# Patient Record
Sex: FEMALE | Race: WHITE | Employment: UNEMPLOYED | URBAN - METROPOLITAN AREA
[De-identification: names, ages, dates, MRNs, and addresses within clinical notes are randomized per-mention and may not be internally consistent; named-entity substitution may affect disease eponyms.]

---

## 2018-03-27 PROBLEM — E78.00 PURE HYPERCHOLESTEROLEMIA: Status: ACTIVE | Noted: 2018-03-27

## 2018-03-27 PROBLEM — E55.9 VITAMIN D DEFICIENCY: Status: ACTIVE | Noted: 2018-03-27

## 2018-03-27 PROBLEM — I10 ESSENTIAL HYPERTENSION: Status: ACTIVE | Noted: 2018-03-27

## 2018-09-24 ENCOUNTER — APPOINTMENT (OUTPATIENT)
Dept: GENERAL RADIOLOGY | Age: 58
End: 2018-09-24
Payer: COMMERCIAL

## 2018-09-24 ENCOUNTER — APPOINTMENT (OUTPATIENT)
Dept: CT IMAGING | Age: 58
End: 2018-09-24
Payer: COMMERCIAL

## 2018-09-24 ENCOUNTER — HOSPITAL ENCOUNTER (OUTPATIENT)
Age: 58
Setting detail: OBSERVATION
Discharge: HOME | End: 2018-09-25
Attending: EMERGENCY MEDICINE | Admitting: INTERNAL MEDICINE
Payer: COMMERCIAL

## 2018-09-24 DIAGNOSIS — R07.89 OTHER CHEST PAIN: Primary | ICD-10-CM

## 2018-09-24 PROBLEM — R07.9 CHEST PAIN: Status: ACTIVE | Noted: 2018-09-24

## 2018-09-24 LAB
ALBUMIN SERPL-MCNC: 4.1 G/DL (ref 3.5–5.2)
ALP BLD-CCNC: 75 U/L (ref 35–104)
ALT SERPL-CCNC: 17 U/L (ref 0–32)
ANION GAP SERPL CALCULATED.3IONS-SCNC: 15 MMOL/L (ref 7–16)
AST SERPL-CCNC: 16 U/L (ref 0–31)
BASOPHILS ABSOLUTE: 0.03 E9/L (ref 0–0.2)
BASOPHILS RELATIVE PERCENT: 0.3 % (ref 0–2)
BILIRUB SERPL-MCNC: 0.5 MG/DL (ref 0–1.2)
BUN BLDV-MCNC: 13 MG/DL (ref 8–23)
CALCIUM SERPL-MCNC: 9.4 MG/DL (ref 8.6–10.2)
CHLORIDE BLD-SCNC: 99 MMOL/L (ref 98–107)
CO2: 26 MMOL/L (ref 22–29)
CREAT SERPL-MCNC: 0.8 MG/DL (ref 0.5–1)
D DIMER: <200 NG/ML DDU
EOSINOPHILS ABSOLUTE: 0.09 E9/L (ref 0.05–0.5)
EOSINOPHILS RELATIVE PERCENT: 1 % (ref 0–6)
GFR AFRICAN AMERICAN: >60
GFR NON-AFRICAN AMERICAN: >60 ML/MIN/1.73
GLUCOSE BLD-MCNC: 113 MG/DL (ref 74–109)
HCT VFR BLD CALC: 40 % (ref 34–48)
HEMOGLOBIN: 13 G/DL (ref 11.5–15.5)
IMMATURE GRANULOCYTES #: 0.03 E9/L
IMMATURE GRANULOCYTES %: 0.3 % (ref 0–5)
LYMPHOCYTES ABSOLUTE: 2.25 E9/L (ref 1.5–4)
LYMPHOCYTES RELATIVE PERCENT: 26.2 % (ref 20–42)
MCH RBC QN AUTO: 28.1 PG (ref 26–35)
MCHC RBC AUTO-ENTMCNC: 32.5 % (ref 32–34.5)
MCV RBC AUTO: 86.4 FL (ref 80–99.9)
MONOCYTES ABSOLUTE: 0.56 E9/L (ref 0.1–0.95)
MONOCYTES RELATIVE PERCENT: 6.5 % (ref 2–12)
NEUTROPHILS ABSOLUTE: 5.62 E9/L (ref 1.8–7.3)
NEUTROPHILS RELATIVE PERCENT: 65.7 % (ref 43–80)
PDW BLD-RTO: 13.8 FL (ref 11.5–15)
PLATELET # BLD: 305 E9/L (ref 130–450)
PMV BLD AUTO: 10.5 FL (ref 7–12)
POTASSIUM SERPL-SCNC: 3.5 MMOL/L (ref 3.5–5)
RBC # BLD: 4.63 E12/L (ref 3.5–5.5)
SODIUM BLD-SCNC: 140 MMOL/L (ref 132–146)
TOTAL PROTEIN: 7.4 G/DL (ref 6.4–8.3)
TROPONIN: <0.01 NG/ML (ref 0–0.03)
TROPONIN: <0.01 NG/ML (ref 0–0.03)
WBC # BLD: 8.6 E9/L (ref 4.5–11.5)

## 2018-09-24 PROCEDURE — 80053 COMPREHEN METABOLIC PANEL: CPT

## 2018-09-24 PROCEDURE — 6370000000 HC RX 637 (ALT 250 FOR IP): Performed by: EMERGENCY MEDICINE

## 2018-09-24 PROCEDURE — 85025 COMPLETE CBC W/AUTO DIFF WBC: CPT

## 2018-09-24 PROCEDURE — 71046 X-RAY EXAM CHEST 2 VIEWS: CPT

## 2018-09-24 PROCEDURE — G0378 HOSPITAL OBSERVATION PER HR: HCPCS

## 2018-09-24 PROCEDURE — 85378 FIBRIN DEGRADE SEMIQUANT: CPT

## 2018-09-24 PROCEDURE — 96374 THER/PROPH/DIAG INJ IV PUSH: CPT

## 2018-09-24 PROCEDURE — 70450 CT HEAD/BRAIN W/O DYE: CPT

## 2018-09-24 PROCEDURE — 6370000000 HC RX 637 (ALT 250 FOR IP): Performed by: NURSE PRACTITIONER

## 2018-09-24 PROCEDURE — 94761 N-INVAS EAR/PLS OXIMETRY MLT: CPT

## 2018-09-24 PROCEDURE — 93005 ELECTROCARDIOGRAM TRACING: CPT | Performed by: EMERGENCY MEDICINE

## 2018-09-24 PROCEDURE — 6370000000 HC RX 637 (ALT 250 FOR IP): Performed by: INTERNAL MEDICINE

## 2018-09-24 PROCEDURE — 36415 COLL VENOUS BLD VENIPUNCTURE: CPT

## 2018-09-24 PROCEDURE — 99285 EMERGENCY DEPT VISIT HI MDM: CPT

## 2018-09-24 PROCEDURE — 84484 ASSAY OF TROPONIN QUANT: CPT

## 2018-09-24 RX ORDER — ATORVASTATIN CALCIUM 20 MG/1
20 TABLET, FILM COATED ORAL DAILY
Status: DISCONTINUED | OUTPATIENT
Start: 2018-09-24 | End: 2018-09-25 | Stop reason: HOSPADM

## 2018-09-24 RX ORDER — FAMOTIDINE 20 MG/1
20 TABLET, FILM COATED ORAL DAILY
Status: DISCONTINUED | OUTPATIENT
Start: 2018-09-24 | End: 2018-09-25 | Stop reason: HOSPADM

## 2018-09-24 RX ORDER — METOPROLOL SUCCINATE 25 MG/1
25 TABLET, EXTENDED RELEASE ORAL DAILY
Status: DISCONTINUED | OUTPATIENT
Start: 2018-09-25 | End: 2018-09-25 | Stop reason: HOSPADM

## 2018-09-24 RX ORDER — ONDANSETRON 2 MG/ML
4 INJECTION INTRAMUSCULAR; INTRAVENOUS ONCE
Status: DISCONTINUED | OUTPATIENT
Start: 2018-09-24 | End: 2018-09-24

## 2018-09-24 RX ORDER — PANTOPRAZOLE SODIUM 40 MG/1
40 TABLET, DELAYED RELEASE ORAL
Status: DISCONTINUED | OUTPATIENT
Start: 2018-09-25 | End: 2018-09-25 | Stop reason: HOSPADM

## 2018-09-24 RX ORDER — METOPROLOL SUCCINATE 25 MG/1
25 TABLET, EXTENDED RELEASE ORAL ONCE
Status: COMPLETED | OUTPATIENT
Start: 2018-09-24 | End: 2018-09-24

## 2018-09-24 RX ORDER — HYDROCHLOROTHIAZIDE 25 MG/1
25 TABLET ORAL DAILY
Status: DISCONTINUED | OUTPATIENT
Start: 2018-09-24 | End: 2018-09-25 | Stop reason: HOSPADM

## 2018-09-24 RX ORDER — RANITIDINE 150 MG/1
150 TABLET, FILM COATED ORAL 2 TIMES DAILY
COMMUNITY
End: 2019-05-29 | Stop reason: ALTCHOICE

## 2018-09-24 RX ORDER — ASPIRIN 81 MG/1
324 TABLET, CHEWABLE ORAL ONCE
Status: COMPLETED | OUTPATIENT
Start: 2018-09-24 | End: 2018-09-24

## 2018-09-24 RX ORDER — HYDRALAZINE HYDROCHLORIDE 20 MG/ML
10 INJECTION INTRAMUSCULAR; INTRAVENOUS ONCE
Status: DISCONTINUED | OUTPATIENT
Start: 2018-09-24 | End: 2018-09-24

## 2018-09-24 RX ADMIN — HYDROCHLOROTHIAZIDE 25 MG: 25 TABLET ORAL at 21:29

## 2018-09-24 RX ADMIN — METOPROLOL SUCCINATE 25 MG: 25 TABLET, EXTENDED RELEASE ORAL at 17:29

## 2018-09-24 RX ADMIN — ATORVASTATIN CALCIUM 20 MG: 20 TABLET, FILM COATED ORAL at 21:29

## 2018-09-24 RX ADMIN — ASPIRIN 81 MG CHEWABLE TABLET 324 MG: 81 TABLET CHEWABLE at 16:23

## 2018-09-24 RX ADMIN — FAMOTIDINE 20 MG: 20 TABLET ORAL at 21:30

## 2018-09-24 NOTE — ED NOTES
FIRST PROVIDER CONTACT ASSESSMENT NOTE      Department of Emergency Medicine   9/24/18  3:46 PM    Chief Complaint: Extremity Weakness (started having back pain early this morning radiated to her chest, then felt generalized weakness to all limbs. )      History of Present Illness:   Greta Mckeon is a 59 y.o. female who presents to the ED for Generalized weakness starting earlier today with fatigue and dizziness. Patient states that she started having back pain . 8 AM this morning. She denies any blurred vision nausea vomiting or chest pain. Medical History:  has a past medical history of Arthritis; Dysthymic disorder; Hyperlipidemia; Hypertension; Hypothyroidism; Seborrhea capitis; Vertigo; and Vitamin D deficiency. Surgical History:  has a past surgical history that includes Cholecystectomy; eye surgery; and Appendectomy. Social History:  reports that she quit smoking about 35 years ago. She has never used smokeless tobacco. She reports that she drinks about 1.2 oz of alcohol per week . She reports that she does not use drugs. Family History: family history includes Diabetes in her father; Heart Disease in her father, mother, and sister; High Blood Pressure in her father and mother; Other in her sister; Stroke in her mother. *ALLERGIES*     Patient has no known allergies.      Physical Exam:      VS:  BP (!) 192/88   Pulse 96   Temp 97.5 °F (36.4 °C) (Temporal)   Resp 16   Ht 5' 6\" (1.676 m)   Wt 201 lb (91.2 kg)   SpO2 99%   BMI 32.44 kg/m²      Initial Plan of Care:  Initiate Treatment-Testing, Proceed toTreatment Area When Bed Available for ED Attending/MLP to Continue Care    -----------------END OF FIRST PROVIDER CONTACT ASSESSMENT NOTE--------------  Electronically signed by TAYLER Drew CNP   DD: 9/24/18           TAYLER Drew CNP  09/24/18 3436

## 2018-09-24 NOTE — ED PROVIDER NOTES
HPI:  9/24/18,   Time: 4:05 PM         Carolyn Vargas is a 59 y.o. female presenting to the ED for Dizzines with chest pain left shoulder radiating to the left chest , beginning several hours  ago. The complaint has been persistent, moderate in severity, and worsened by standing, deep breath. Patient's states the chest pains were coughing. She does report a history of hypertension hyperlipidemia. She does not smoke, stopped smoking 35 years ago. . Does have an extensive family history of coronary artery disease. She denies any fevers chills nausea vomiting. ROS:   Pertinent positives and negatives are stated within HPI, all other systems reviewed and are negative.  --------------------------------------------- PAST HISTORY ---------------------------------------------  Past Medical History:  has a past medical history of Arthritis; Dysthymic disorder; Hyperlipidemia; Hypertension; Hypothyroidism; Seborrhea capitis; Vertigo; and Vitamin D deficiency. Past Surgical History:  has a past surgical history that includes Cholecystectomy; eye surgery; and Appendectomy. Social History:  reports that she quit smoking about 35 years ago. She has never used smokeless tobacco. She reports that she drinks about 1.2 oz of alcohol per week . She reports that she does not use drugs. Family History: family history includes Diabetes in her father; Heart Disease in her father, mother, and sister; High Blood Pressure in her father and mother; Other in her sister; Stroke in her mother. The patients home medications have been reviewed. Allergies: Patient has no known allergies.     -------------------------------------------------- RESULTS -------------------------------------------------  All laboratory and radiology results have been personally reviewed by myself   LABS:  Results for orders placed or performed during the hospital encounter of 09/24/18   CBC auto differential   Result Value Ref Range    WBC 8.6 4.5 - 11.5 E9/L    RBC 4.63 3.50 - 5.50 E12/L    Hemoglobin 13.0 11.5 - 15.5 g/dL    Hematocrit 40.0 34.0 - 48.0 %    MCV 86.4 80.0 - 99.9 fL    MCH 28.1 26.0 - 35.0 pg    MCHC 32.5 32.0 - 34.5 %    RDW 13.8 11.5 - 15.0 fL    Platelets 007 822 - 034 E9/L    MPV 10.5 7.0 - 12.0 fL    Neutrophils % 65.7 43.0 - 80.0 %    Immature Granulocytes % 0.3 0.0 - 5.0 %    Lymphocytes % 26.2 20.0 - 42.0 %    Monocytes % 6.5 2.0 - 12.0 %    Eosinophils % 1.0 0.0 - 6.0 %    Basophils % 0.3 0.0 - 2.0 %    Neutrophils # 5.62 1.80 - 7.30 E9/L    Immature Granulocytes # 0.03 E9/L    Lymphocytes # 2.25 1.50 - 4.00 E9/L    Monocytes # 0.56 0.10 - 0.95 E9/L    Eosinophils # 0.09 0.05 - 0.50 E9/L    Basophils # 0.03 0.00 - 0.20 E9/L   Comprehensive Metabolic Panel   Result Value Ref Range    Sodium 140 132 - 146 mmol/L    Potassium 3.5 3.5 - 5.0 mmol/L    Chloride 99 98 - 107 mmol/L    CO2 26 22 - 29 mmol/L    Anion Gap 15 7 - 16 mmol/L    Glucose 113 (H) 74 - 109 mg/dL    BUN 13 8 - 23 mg/dL    CREATININE 0.8 0.5 - 1.0 mg/dL    GFR Non-African American >60 >=60 mL/min/1.73    GFR African American >60     Calcium 9.4 8.6 - 10.2 mg/dL    Total Protein 7.4 6.4 - 8.3 g/dL    Alb 4.1 3.5 - 5.2 g/dL    Total Bilirubin 0.5 0.0 - 1.2 mg/dL    Alkaline Phosphatase 75 35 - 104 U/L    ALT 17 0 - 32 U/L    AST 16 0 - 31 U/L   Troponin   Result Value Ref Range    Troponin <0.01 0.00 - 0.03 ng/mL   D-Dimer, Quantitative   Result Value Ref Range    D-Dimer, Quant <200 ng/mL DDU   Troponin   Result Value Ref Range    Troponin <0.01 0.00 - 0.03 ng/mL   Troponin   Result Value Ref Range    Troponin <0.01 0.00 - 0.03 ng/mL   EKG 12 Lead   Result Value Ref Range    Ventricular Rate 81 BPM    Atrial Rate 81 BPM    P-R Interval 158 ms    QRS Duration 88 ms    Q-T Interval 396 ms    QTc Calculation (Bazett) 460 ms    P Axis 57 degrees    R Axis 48 degrees    T Axis 54 degrees       RADIOLOGY:  Interpreted by Radiologist.  Stress/Rest NM Myocardial SPECT RX 1623)   metoprolol succinate (TOPROL XL) extended release tablet 25 mg (25 mg Oral Given 18 1729)   regadenoson (LEXISCAN) injection 0.4 mg (0.4 mg Intravenous Given 18 0942)   technetium sestamibi (CARDIOLITE) injection 10 millicurie (10 millicuries Intravenous Given 18 0805)   technetium sestamibi (CARDIOLITE) injection 35 millicurie (35 millicuries Intravenous Given 18 0944)         Medical Decision Makin yo female presents with left shoulder pain rating to her left chest. Significant family history coronary artery disease. Patient stated her last stress test was a year ago. ASA given. Counseling: The emergency provider has spoken with the patient and family member patient, father and daughter and discussed todays results, in addition to providing specific details for the plan of care and counseling regarding the diagnosis and prognosis. Questions are answered at this time and they are agreeable with the plan.      --------------------------------- IMPRESSION AND DISPOSITION ---------------------------------    IMPRESSION  1.  Other chest pain        DISPOSITION  Disposition: Admit to telemetry  Patient condition is stable               Nolvia Strong DO  [phone]

## 2018-09-25 ENCOUNTER — APPOINTMENT (OUTPATIENT)
Dept: NUCLEAR MEDICINE | Age: 58
End: 2018-09-25
Payer: COMMERCIAL

## 2018-09-25 VITALS
WEIGHT: 201 LBS | RESPIRATION RATE: 16 BRPM | BODY MASS INDEX: 32.3 KG/M2 | DIASTOLIC BLOOD PRESSURE: 84 MMHG | TEMPERATURE: 97.6 F | HEIGHT: 66 IN | OXYGEN SATURATION: 99 % | SYSTOLIC BLOOD PRESSURE: 160 MMHG | HEART RATE: 66 BPM

## 2018-09-25 PROBLEM — I10 UNCONTROLLED HYPERTENSION: Status: ACTIVE | Noted: 2018-09-25

## 2018-09-25 PROBLEM — R07.9 CHEST PAIN: Status: RESOLVED | Noted: 2018-09-24 | Resolved: 2018-09-25

## 2018-09-25 LAB
LV EF: 79 %
LVEF MODALITY: NORMAL
TROPONIN: <0.01 NG/ML (ref 0–0.03)

## 2018-09-25 PROCEDURE — G0378 HOSPITAL OBSERVATION PER HR: HCPCS

## 2018-09-25 PROCEDURE — 6370000000 HC RX 637 (ALT 250 FOR IP): Performed by: INTERNAL MEDICINE

## 2018-09-25 PROCEDURE — 3430000000 HC RX DIAGNOSTIC RADIOPHARMACEUTICAL: Performed by: RADIOLOGY

## 2018-09-25 PROCEDURE — 78452 HT MUSCLE IMAGE SPECT MULT: CPT

## 2018-09-25 PROCEDURE — 6360000002 HC RX W HCPCS: Performed by: INTERNAL MEDICINE

## 2018-09-25 PROCEDURE — 84484 ASSAY OF TROPONIN QUANT: CPT

## 2018-09-25 PROCEDURE — 36415 COLL VENOUS BLD VENIPUNCTURE: CPT

## 2018-09-25 PROCEDURE — A9500 TC99M SESTAMIBI: HCPCS | Performed by: RADIOLOGY

## 2018-09-25 PROCEDURE — 93017 CV STRESS TEST TRACING ONLY: CPT

## 2018-09-25 RX ORDER — AMLODIPINE BESYLATE 5 MG/1
5 TABLET ORAL DAILY
Qty: 30 TABLET | Refills: 3 | Status: SHIPPED | OUTPATIENT
Start: 2018-09-25 | End: 2018-11-30 | Stop reason: SDUPTHER

## 2018-09-25 RX ORDER — ASPIRIN 81 MG/1
81 TABLET ORAL DAILY
Status: DISCONTINUED | OUTPATIENT
Start: 2018-09-25 | End: 2018-09-25 | Stop reason: HOSPADM

## 2018-09-25 RX ORDER — ASPIRIN 81 MG/1
81 TABLET ORAL DAILY
Qty: 30 TABLET | Refills: 3 | COMMUNITY
Start: 2018-09-25

## 2018-09-25 RX ADMIN — REGADENOSON 0.4 MG: 0.08 INJECTION, SOLUTION INTRAVENOUS at 09:42

## 2018-09-25 RX ADMIN — Medication 35 MILLICURIE: at 09:44

## 2018-09-25 RX ADMIN — Medication 10 MILLICURIE: at 08:05

## 2018-09-25 RX ADMIN — ATORVASTATIN CALCIUM 20 MG: 20 TABLET, FILM COATED ORAL at 13:27

## 2018-09-25 RX ADMIN — HYDROCHLOROTHIAZIDE 25 MG: 25 TABLET ORAL at 13:27

## 2018-09-25 RX ADMIN — FAMOTIDINE 20 MG: 20 TABLET ORAL at 11:11

## 2018-09-25 RX ADMIN — METOPROLOL SUCCINATE 25 MG: 25 TABLET, FILM COATED, EXTENDED RELEASE ORAL at 13:27

## 2018-09-25 NOTE — H&P
[facility]  History and Physical      CHIEF COMPLAINT:  Chest pain, facial numbness    Reason for Admission:  As above    History Obtained From:  Patient and     PCP :  Maciel Lemus MD    [address] [facility] [address]      HISTORY OF PRESENT ILLNESS:      The patient is a 59 y.o. female presented to the emergency room with chief complaint of chest pain. She also had dizziness, numbness in the face.  reports the patient has skipped her blood pressure medications the day prior. She was noted to be hypertensive in the emergency room. Her blood pressure is much lower at the current time. She has no other complaints. She is status post stress test.     Past Medical History:        Diagnosis Date    Arthritis     osteoarthritis    Dysthymic disorder     Hyperlipidemia     Hypertension     Hypothyroidism     Seborrhea capitis     Vertigo     Vitamin D deficiency      Past Surgical History:        Procedure Laterality Date    APPENDECTOMY      CHOLECYSTECTOMY      EYE SURGERY           Medications Prior to Admission:    Prescriptions Prior to Admission: ranitidine (RANITIDINE 150 MAX STRENGTH) 150 MG tablet, Take 150 mg by mouth 2 times daily  atorvastatin (LIPITOR) 20 MG tablet, Take 1 tablet by mouth daily  hydrochlorothiazide (HYDRODIURIL) 25 MG tablet, Take 1 tablet by mouth daily  metoprolol succinate (TOPROL XL) 25 MG extended release tablet, Take 1 tablet by mouth daily  omeprazole (PRILOSEC) 10 MG capsule, Take 10 mg by mouth daily. Allergies:  Patient has no known allergies. Social History:   Social History     Social History    Marital status:      Spouse name: N/A    Number of children: N/A    Years of education: N/A     Occupational History    Not on file.      Social History Main Topics    Smoking status: Former Smoker     Quit date: 2/24/1983    Smokeless tobacco: Never Used    Alcohol use 1.2 oz/week     2 Glasses of wine per week    MCV 86.4 80.0 - 99.9 fL    MCH 28.1 26.0 - 35.0 pg    MCHC 32.5 32.0 - 34.5 %    RDW 13.8 11.5 - 15.0 fL    Platelets 675 853 - 581 E9/L    MPV 10.5 7.0 - 12.0 fL    Neutrophils % 65.7 43.0 - 80.0 %    Immature Granulocytes % 0.3 0.0 - 5.0 %    Lymphocytes % 26.2 20.0 - 42.0 %    Monocytes % 6.5 2.0 - 12.0 %    Eosinophils % 1.0 0.0 - 6.0 %    Basophils % 0.3 0.0 - 2.0 %    Neutrophils # 5.62 1.80 - 7.30 E9/L    Immature Granulocytes # 0.03 E9/L    Lymphocytes # 2.25 1.50 - 4.00 E9/L    Monocytes # 0.56 0.10 - 0.95 E9/L    Eosinophils # 0.09 0.05 - 0.50 E9/L    Basophils # 0.03 0.00 - 0.20 E9/L   Comprehensive Metabolic Panel    Collection Time: 09/24/18  2:55 PM   Result Value Ref Range    Sodium 140 132 - 146 mmol/L    Potassium 3.5 3.5 - 5.0 mmol/L    Chloride 99 98 - 107 mmol/L    CO2 26 22 - 29 mmol/L    Anion Gap 15 7 - 16 mmol/L    Glucose 113 (H) 74 - 109 mg/dL    BUN 13 8 - 23 mg/dL    CREATININE 0.8 0.5 - 1.0 mg/dL    GFR Non-African American >60 >=60 mL/min/1.73    GFR African American >60     Calcium 9.4 8.6 - 10.2 mg/dL    Total Protein 7.4 6.4 - 8.3 g/dL    Alb 4.1 3.5 - 5.2 g/dL    Total Bilirubin 0.5 0.0 - 1.2 mg/dL    Alkaline Phosphatase 75 35 - 104 U/L    ALT 17 0 - 32 U/L    AST 16 0 - 31 U/L   Troponin    Collection Time: 09/24/18  2:55 PM   Result Value Ref Range    Troponin <0.01 0.00 - 0.03 ng/mL   D-Dimer, Quantitative    Collection Time: 09/24/18  4:20 PM   Result Value Ref Range    D-Dimer, Quant <200 ng/mL DDU   Troponin    Collection Time: 09/24/18  8:01 PM   Result Value Ref Range    Troponin <0.01 0.00 - 0.03 ng/mL   Troponin    Collection Time: 09/25/18 12:29 AM   Result Value Ref Range    Troponin <0.01 0.00 - 0.03 ng/mL       Stress/Rest NM Myocardial SPECT RX   Final Result   ? 1. No pharmacologically-induced perfusion defect identified. 2. Normal left ventricular wall motion.    3. Left ventricular ejection fraction of 79 %         CT Head WO Contrast   Final Result   No

## 2018-09-25 NOTE — CONSULTS
Glasses of wine per week    Drug use: No    Sexual activity: Not on file     Other Topics Concern    Not on file     Social History Narrative    No narrative on file       Allergies:  No Known Allergies    Home Meds:  Prior to Admission medications    Medication Sig Start Date End Date Taking? Authorizing Provider   ranitidine (RANITIDINE 150 MAX STRENGTH) 150 MG tablet Take 150 mg by mouth 2 times daily   Yes Historical Provider, MD   atorvastatin (LIPITOR) 20 MG tablet Take 1 tablet by mouth daily 4/24/18 9/24/18 Yes Bhavya Cameron MD   hydrochlorothiazide (HYDRODIURIL) 25 MG tablet Take 1 tablet by mouth daily 4/24/18 9/24/18 Yes Bhavya Cameron MD   metoprolol succinate (TOPROL XL) 25 MG extended release tablet Take 1 tablet by mouth daily 4/24/18 9/24/18 Yes Bhavya Cameron MD   omeprazole (PRILOSEC) 10 MG capsule Take 10 mg by mouth daily.    Yes Historical Provider, MD       Current Medications:  Current Facility-Administered Medications   Medication Dose Route Frequency Provider Last Rate Last Dose    regadenoson (LEXISCAN) injection 0.4 mg  0.4 mg Intravenous ONCE PRN David Salazar MD        technetium sestamibi (CARDIOLITE) injection 35 millicurie  35 millicurie Intravenous Once Addison Louis MD        pantoprazole (PROTONIX) tablet 40 mg  40 mg Oral QAM AC Junior Courtney MD        atorvastatin (LIPITOR) tablet 20 mg  20 mg Oral Daily Junior Courtney MD   20 mg at 09/24/18 2129    hydrochlorothiazide (HYDRODIURIL) tablet 25 mg  25 mg Oral Daily Junior Courtney MD   25 mg at 09/24/18 2129    metoprolol succinate (TOPROL XL) extended release tablet 25 mg  25 mg Oral Daily Junior Courtney MD        famotidine (PEPCID) tablet 20 mg  20 mg Oral Daily Junior Courtney MD   20 mg at 09/24/18 2130         Review of Systems:   Constitutional: No fever, chills, sweats  Cardiac: As per HPI  Pulmonary: No cough, wheeze, hemoptysis  HEENT: No visual disturbances or difficult swallowing  GI: No nausea, vomiting, diarrhea, abdominal pain, rectal bleeding  : No dysuria or hematuria  Endocrine: No excessive thirst, heat or cold intolerance. Musculoskeletal: Left shoulder pain (previous rotator cuff injury) or muscle aches. No claudication  Skin: No skin breakdown or rashes  Neuro: No headache, confusion, or seizures  Psych: No depression, anxiety    Physical Exam:  BP (!) 170/76   Pulse 60   Temp 97.8 °F (36.6 °C)   Resp 16   Ht 5' 6\" (1.676 m)   Wt 201 lb (91.2 kg)   SpO2 99%   BMI 32.44 kg/m²   Weight change: Wt Readings from Last 3 Encounters:   09/24/18 201 lb (91.2 kg)   08/28/18 201 lb (91.2 kg)   03/27/18 199 lb (90.3 kg)       General: Awake, alert, oriented x3, no acute distress    HEENT: Normocephalic and atraumatic, pupils equal and round. Sclera nonicteric and oral mucosa moist.  Tongue and trachea midline. Neck: No JVD or bruits. No thyroid enlargement or adenopathy    Cardiac: Regular rate and rhythm, normal S1 and S2, no S3. Apical impulse is normal.  No murmurs, rubs or clicks. No carotid bruits and no abdominal bruits. No peripheral edema, cyanosis or clubbing. Normal pulses in the upper and lower extremities bilaterally. Resp: Unlabored respirations at rest.  No wheezes, rales or rhonchi. Abdomen: soft, nontender, nondistended, no gross organomegaly or mass    Skin: Warm and dry, no cyanosis. Musculoskeletal: normal tone and strength in the upper and lower extremities bilaterally    Neuro: Moves all extremities appropriately to command. Normal sensation to light touch in the upper and lower extremities bilaterally    Psych: Cooperative, and normal affect    Intake/Output:  No intake or output data in the 24 hours ending 09/25/18 0840  No intake/output data recorded.     Laboratory Tests:  Lab Results   Component Value Date    CREATININE 0.8 09/24/2018    BUN 13 09/24/2018     09/24/2018    K 3.5 09/24/2018

## 2018-09-25 NOTE — PROGRESS NOTES
Nuclear stress test images reviewed:  LVEF normal, and no fixed or reversible defects. No further inpatient cardiac plans and will sign off. Please call if any questions.   Thank you

## 2018-09-27 LAB
EKG ATRIAL RATE: 81 BPM
EKG P AXIS: 57 DEGREES
EKG P-R INTERVAL: 158 MS
EKG Q-T INTERVAL: 396 MS
EKG QRS DURATION: 88 MS
EKG QTC CALCULATION (BAZETT): 460 MS
EKG R AXIS: 48 DEGREES
EKG T AXIS: 54 DEGREES
EKG VENTRICULAR RATE: 81 BPM

## 2019-12-09 PROBLEM — I10 UNCONTROLLED HYPERTENSION: Status: RESOLVED | Noted: 2018-09-25 | Resolved: 2019-12-09

## 2020-10-06 ENCOUNTER — TELEPHONE (OUTPATIENT)
Dept: ADMINISTRATIVE | Age: 60
End: 2020-10-06

## 2020-10-06 NOTE — TELEPHONE ENCOUNTER
[facility] for: 10/12/20 @ 12:40. Patient Appointment Form:      PCP: Óscar Adamson   Referring: Óscar Adamson    Has the Patient:    Seen a Cardiologist? Yes - Dr Stephenie Quiroz - [facility] only - [facility], Dr. Boby Rivera now    Had a heart catheterization? No    Had heart surgery? No    Had a stress test or nuclear stress test? Yes Epic     Had an echocardiogram? Many years ago - over 10 years - ? Not sure where     Had a vascular ultrasound? Yes - duplex - Epic     Had a 24/48 heart monitor or extended cardiac event monitor? No    Had recent blood work in the last 6 months? Yes - Epic - [facility]     Had a pacemaker/ICD/ILR implant? No    Seen an Electrophysiologist? No        Will send records via: All Recs in Epic       Date & time of appointment:  10/12/20 @ 12:40 with Dr. Boby Rivera.

## 2020-10-15 ENCOUNTER — OFFICE VISIT (OUTPATIENT)
Dept: CARDIOLOGY CLINIC | Age: 60
End: 2020-10-15
Payer: COMMERCIAL

## 2020-10-15 VITALS
SYSTOLIC BLOOD PRESSURE: 136 MMHG | WEIGHT: 209 LBS | HEART RATE: 66 BPM | HEIGHT: 66 IN | DIASTOLIC BLOOD PRESSURE: 84 MMHG | BODY MASS INDEX: 33.59 KG/M2 | RESPIRATION RATE: 16 BRPM

## 2020-10-15 PROCEDURE — 93000 ELECTROCARDIOGRAM COMPLETE: CPT | Performed by: INTERNAL MEDICINE

## 2020-10-15 PROCEDURE — 99244 OFF/OP CNSLTJ NEW/EST MOD 40: CPT | Performed by: INTERNAL MEDICINE

## 2020-10-15 RX ORDER — DIMENHYDRINATE 50 MG
200 TABLET ORAL DAILY
COMMUNITY

## 2020-10-15 NOTE — PROGRESS NOTES
Patient Active Problem List   Diagnosis    Essential hypertension    Vitamin D deficiency    Pure hypercholesterolemia       Current Outpatient Medications   Medication Sig Dispense Refill    Coenzyme Q10 (CO Q-10) 100 MG CAPS Take by mouth daily      rosuvastatin (CRESTOR) 20 MG tablet TAKE 1 TABLET BY MOUTH EVERY NIGHT 30 tablet 11    ezetimibe (ZETIA) 10 MG tablet Take 1 tablet by mouth daily 30 tablet 11    hydrochlorothiazide (HYDRODIURIL) 25 MG tablet Take 1 tablet by mouth daily 30 tablet 11    metoprolol succinate (TOPROL XL) 25 MG extended release tablet Take 1 tablet by mouth daily 30 tablet 11    amLODIPine (NORVASC) 5 MG tablet Take 1 tablet by mouth daily 30 tablet 11    Cholecalciferol (VITAMIN D3) 2000 units CAPS Take by mouth      calcium carbonate 600 MG TABS tablet Take 1 tablet by mouth daily      Multiple Vitamin (MULTI-VITAMIN DAILY PO) Take by mouth      diclofenac sodium 1 % GEL Apply 4 g topically 4 times daily 2 Tube 5    aspirin 81 MG EC tablet Take 1 tablet by mouth daily 30 tablet 3    omeprazole (PRILOSEC) 10 MG capsule Take 10 mg by mouth daily. No current facility-administered medications for this visit. CC:    Patient is seen in Initial Evaluation:  1. Precordial pain    2. Essential hypertension    3. Pure hypercholesterolemia    4. Palpitations    5. Family history of early CAD        HPI:  Patient seen in evaluation for chest pain. Has atypical stabbing chest pain that radiates to her neck. Not associated with exertion. Notes diaphoresis afterwards. Family history of early CAD. Also notes development of palpitations - mainly at night.     ROS:   General: No unusual weight gain, no change in exercise tolerance  Skin: No rash or itching  EENT: No vision changes or nosebleeds  Cardiovascular: No orthopnea or paroxysmal nocturnal dyspnea  Respiratory: No cough or hemoptysis  Gastrointestinal: No hematemesis or recent changes in bowel Other Sister         osteoporosis    Heart Disease Sister         MVP       Past Medical History:   Diagnosis Date    Arthritis     osteoarthritis    Dysthymic disorder     Hyperlipidemia     Hypertension     Hypothyroidism     Seborrhea capitis     Vertigo     Vitamin D deficiency        PHYSICAL EXAM:  CONSTITUTIONAL:  Well developed, well nourished    Vitals:    10/15/20 1037   BP: 136/84   Pulse: 66   Resp: 16   Weight: 209 lb (94.8 kg)   Height: 5' 6\" (1.676 m)     HEAD & FACE: Normocephalic. Symmetric. EYES: No xanthelasma. Conjunctivae not injected. EARS, NOSE, MOUTH & THROAT: Good dentition. No oral pallor or cyanosis. NECK: No JVD at 30 degrees. No thyromegaly. RESPIRATORY: Clear to auscultation and percussion in all fields. No use of accessory muscle or intercostal retractions. CARDIOVASCULAR: Regular rate and rhythm. No lifts or thrills on palpitation. Auscultation with normal S1-S2 in intensity and splitting. No carotid bruits. Abdominal aorta not enlarged. Femoral arteries without bruits. Pedal pulses 2+. No edema. ABDOMEN: Soft without hepatic or splenic enlargement. No tenderness. MUSCULOSKELETAL: No kyphosis or scoliosis of the back. Good muscle strength and tone. No muscle atrophy. Normal gait and ability to undergo exercise stress testing. EXTREMITIES: No clubbing or cyanosis. SKIN: No Xanthomas or ulcerations. NEUROLOGIC: Oriented to time, place and person. Normal mood and affect. LYMPHATIC:  No palpable neck or supraclavicular nodes. No splenomegaly. EKG: the EKG tracing was reviewed and found to reveal: Normal sinus rhythm. No change compared to prior tracing. ASSESSMENT:                                                     ORDERS:       Diagnosis Orders   1. Precordial pain  EKG 12 Lead    CTA CORON EJECT FRAC WALL MOTION   2. Essential hypertension     3. Pure hypercholesterolemia     4. Palpitations     5.  Family history of early CAD Above assessment cardiac issues stable. PLAN:   See above orders. Medication reconciliation completed. Patient unable to walk. We will schedule for coronary CTA. Old records were reviewed and found to reveal: LDL cholesterol 90 on 5/12/2020  Discussed issues that would prompt earlier evaluation. Same cardiac medications. Follow-up office visit in -pending results of above testing.

## 2020-10-20 ENCOUNTER — HOSPITAL ENCOUNTER (OUTPATIENT)
Age: 60
Discharge: HOME | End: 2020-10-20
Payer: COMMERCIAL

## 2020-10-20 LAB
ANION GAP SERPL CALCULATED.3IONS-SCNC: 12 MMOL/L (ref 7–16)
BUN BLDV-MCNC: 13 MG/DL (ref 8–23)
CALCIUM SERPL-MCNC: 9.5 MG/DL (ref 8.6–10.2)
CHLORIDE BLD-SCNC: 98 MMOL/L (ref 98–107)
CO2: 29 MMOL/L (ref 22–29)
CREAT SERPL-MCNC: 0.9 MG/DL (ref 0.5–1)
GFR AFRICAN AMERICAN: >60
GFR NON-AFRICAN AMERICAN: >60 ML/MIN/1.73
GLUCOSE BLD-MCNC: 102 MG/DL (ref 74–99)
POTASSIUM SERPL-SCNC: 3.7 MMOL/L (ref 3.5–5)
SODIUM BLD-SCNC: 139 MMOL/L (ref 132–146)

## 2020-10-20 PROCEDURE — 80048 BASIC METABOLIC PNL TOTAL CA: CPT

## 2020-10-20 PROCEDURE — 36415 COLL VENOUS BLD VENIPUNCTURE: CPT

## 2020-11-09 ENCOUNTER — HOSPITAL ENCOUNTER (OUTPATIENT)
Dept: CT IMAGING | Age: 60
Discharge: HOME | End: 2020-11-11
Payer: COMMERCIAL

## 2020-11-09 VITALS
SYSTOLIC BLOOD PRESSURE: 125 MMHG | RESPIRATION RATE: 14 BRPM | HEIGHT: 66 IN | OXYGEN SATURATION: 97 % | HEART RATE: 63 BPM | TEMPERATURE: 98.6 F | WEIGHT: 196 LBS | DIASTOLIC BLOOD PRESSURE: 57 MMHG | BODY MASS INDEX: 31.5 KG/M2

## 2020-11-09 PROCEDURE — 75574 CT ANGIO HRT W/3D IMAGE: CPT

## 2020-11-09 PROCEDURE — 6360000004 HC RX CONTRAST MEDICATION: Performed by: RADIOLOGY

## 2020-11-09 PROCEDURE — 2580000003 HC RX 258: Performed by: INTERNAL MEDICINE

## 2020-11-09 PROCEDURE — 6370000000 HC RX 637 (ALT 250 FOR IP): Performed by: INTERNAL MEDICINE

## 2020-11-09 PROCEDURE — 75574 CT ANGIO HRT W/3D IMAGE: CPT | Performed by: INTERNAL MEDICINE

## 2020-11-09 RX ORDER — 0.9 % SODIUM CHLORIDE 0.9 %
1000 INTRAVENOUS SOLUTION INTRAVENOUS ONCE
Status: COMPLETED | OUTPATIENT
Start: 2020-11-09 | End: 2020-11-09

## 2020-11-09 RX ORDER — NITROGLYCERIN 0.4 MG/1
0.8 TABLET SUBLINGUAL
Status: COMPLETED | OUTPATIENT
Start: 2020-11-09 | End: 2020-11-09

## 2020-11-09 RX ORDER — NAPROXEN 250 MG/1
250 TABLET ORAL 2 TIMES DAILY PRN
COMMUNITY
End: 2021-06-17

## 2020-11-09 RX ADMIN — NITROGLYCERIN 0.8 MG: 0.4 TABLET, ORALLY DISINTEGRATING SUBLINGUAL at 10:42

## 2020-11-09 RX ADMIN — METOPROLOL TARTRATE 25 MG: 25 TABLET ORAL at 09:42

## 2020-11-09 RX ADMIN — IOPAMIDOL 80 ML: 755 INJECTION, SOLUTION INTRAVENOUS at 10:32

## 2020-11-09 RX ADMIN — SODIUM CHLORIDE 1000 ML: 9 INJECTION, SOLUTION INTRAVENOUS at 09:45

## 2020-11-09 ASSESSMENT — PAIN - FUNCTIONAL ASSESSMENT: PAIN_FUNCTIONAL_ASSESSMENT: 0-10

## 2020-11-09 NOTE — FLOWSHEET NOTE
Patient arrived via for CTA coronary arteries. Allergies reviewed, instructions given to  include protocol for heart rate, b/p, necessary medications that will be given, IV site and proper breath hold during scan. Questions answered and expressed understanding confirmed. Placed on monitoring devices for heart rate and rhythm, B/P taken,  IV started, flushed and prn adapter attached. Dr. Valeria Hurd informed of patient's arrival, orders given. Patient taken to cat scan for coronary cta. Placed on ct table, all monitors connected to the patient. Scanning started @ 1038. Nitro given @ [phone]. Procedure completed @ 2821. Patient taken off of ct table and transferred back to IR room for monitoring. Patient states that she is starting to develop a headache, but otherwise, feels good. Patient discharged home with  @ [phone].

## 2020-11-10 ENCOUNTER — TELEPHONE (OUTPATIENT)
Dept: CARDIOLOGY CLINIC | Age: 60
End: 2020-11-10

## 2020-11-11 ENCOUNTER — TELEPHONE (OUTPATIENT)
Dept: CARDIOLOGY CLINIC | Age: 60
End: 2020-11-11

## 2020-11-20 ENCOUNTER — TELEPHONE (OUTPATIENT)
Dept: CARDIOLOGY CLINIC | Age: 60
End: 2020-11-20

## 2020-11-20 ENCOUNTER — HOSPITAL ENCOUNTER (OUTPATIENT)
Dept: CARDIAC CATH/INVASIVE PROCEDURES | Age: 60
Discharge: HOME | End: 2020-11-20
Payer: COMMERCIAL

## 2020-11-20 VITALS
RESPIRATION RATE: 18 BRPM | BODY MASS INDEX: 31.82 KG/M2 | TEMPERATURE: 97.7 F | WEIGHT: 198 LBS | DIASTOLIC BLOOD PRESSURE: 81 MMHG | HEIGHT: 66 IN | SYSTOLIC BLOOD PRESSURE: 162 MMHG | OXYGEN SATURATION: 98 % | HEART RATE: 77 BPM

## 2020-11-20 LAB
ABO/RH: NORMAL
ANION GAP SERPL CALCULATED.3IONS-SCNC: 14 MMOL/L (ref 7–16)
ANTIBODY SCREEN: NORMAL
BUN BLDV-MCNC: 12 MG/DL (ref 8–23)
CALCIUM SERPL-MCNC: 9.6 MG/DL (ref 8.6–10.2)
CHLORIDE BLD-SCNC: 98 MMOL/L (ref 98–107)
CO2: 27 MMOL/L (ref 22–29)
CREAT SERPL-MCNC: 0.9 MG/DL (ref 0.5–1)
GFR AFRICAN AMERICAN: >60
GFR NON-AFRICAN AMERICAN: >60 ML/MIN/1.73
GLUCOSE BLD-MCNC: 115 MG/DL (ref 74–99)
HCT VFR BLD CALC: 39.5 % (ref 34–48)
HEMOGLOBIN: 13.2 G/DL (ref 11.5–15.5)
MCH RBC QN AUTO: 28.5 PG (ref 26–35)
MCHC RBC AUTO-ENTMCNC: 33.4 % (ref 32–34.5)
MCV RBC AUTO: 85.3 FL (ref 80–99.9)
PDW BLD-RTO: 13.7 FL (ref 11.5–15)
PLATELET # BLD: 338 E9/L (ref 130–450)
PMV BLD AUTO: 11.2 FL (ref 7–12)
POTASSIUM SERPL-SCNC: 2.8 MMOL/L (ref 3.5–5)
RBC # BLD: 4.63 E12/L (ref 3.5–5.5)
SODIUM BLD-SCNC: 139 MMOL/L (ref 132–146)
WBC # BLD: 5.3 E9/L (ref 4.5–11.5)

## 2020-11-20 PROCEDURE — 86850 RBC ANTIBODY SCREEN: CPT

## 2020-11-20 PROCEDURE — C1887 CATHETER, GUIDING: HCPCS

## 2020-11-20 PROCEDURE — 93458 L HRT ARTERY/VENTRICLE ANGIO: CPT

## 2020-11-20 PROCEDURE — 2580000003 HC RX 258: Performed by: INTERNAL MEDICINE

## 2020-11-20 PROCEDURE — 93458 L HRT ARTERY/VENTRICLE ANGIO: CPT | Performed by: INTERNAL MEDICINE

## 2020-11-20 PROCEDURE — 6370000000 HC RX 637 (ALT 250 FOR IP): Performed by: INTERNAL MEDICINE

## 2020-11-20 PROCEDURE — 36415 COLL VENOUS BLD VENIPUNCTURE: CPT

## 2020-11-20 PROCEDURE — 2500000003 HC RX 250 WO HCPCS

## 2020-11-20 PROCEDURE — 86900 BLOOD TYPING SEROLOGIC ABO: CPT

## 2020-11-20 PROCEDURE — C1894 INTRO/SHEATH, NON-LASER: HCPCS

## 2020-11-20 PROCEDURE — 86901 BLOOD TYPING SEROLOGIC RH(D): CPT

## 2020-11-20 PROCEDURE — 85027 COMPLETE CBC AUTOMATED: CPT

## 2020-11-20 PROCEDURE — 80048 BASIC METABOLIC PNL TOTAL CA: CPT

## 2020-11-20 PROCEDURE — 2709999900 HC NON-CHARGEABLE SUPPLY

## 2020-11-20 PROCEDURE — C1769 GUIDE WIRE: HCPCS

## 2020-11-20 PROCEDURE — 6360000002 HC RX W HCPCS

## 2020-11-20 RX ORDER — SODIUM CHLORIDE 9 MG/ML
INJECTION, SOLUTION INTRAVENOUS ONCE
Status: COMPLETED | OUTPATIENT
Start: 2020-11-20 | End: 2020-11-20

## 2020-11-20 RX ORDER — ACETAMINOPHEN 325 MG/1
650 TABLET ORAL EVERY 4 HOURS PRN
Status: DISCONTINUED | OUTPATIENT
Start: 2020-11-20 | End: 2020-11-21 | Stop reason: HOSPADM

## 2020-11-20 RX ORDER — SODIUM CHLORIDE 0.9 % (FLUSH) 0.9 %
10 SYRINGE (ML) INJECTION PRN
Status: DISCONTINUED | OUTPATIENT
Start: 2020-11-20 | End: 2020-11-21 | Stop reason: HOSPADM

## 2020-11-20 RX ORDER — SODIUM CHLORIDE 9 MG/ML
INJECTION, SOLUTION INTRAVENOUS CONTINUOUS
Status: ACTIVE | OUTPATIENT
Start: 2020-11-20 | End: 2020-11-20

## 2020-11-20 RX ORDER — SODIUM CHLORIDE 0.9 % (FLUSH) 0.9 %
10 SYRINGE (ML) INJECTION EVERY 12 HOURS SCHEDULED
Status: DISCONTINUED | OUTPATIENT
Start: 2020-11-20 | End: 2020-11-21 | Stop reason: HOSPADM

## 2020-11-20 RX ADMIN — SODIUM CHLORIDE 20 ML/HR: 9 INJECTION, SOLUTION INTRAVENOUS at 07:06

## 2020-11-20 RX ADMIN — ACETAMINOPHEN 650 MG: 325 TABLET, FILM COATED ORAL at 09:52

## 2020-11-20 ASSESSMENT — PAIN SCALES - GENERAL: PAINLEVEL_OUTOF10: 6

## 2020-11-20 NOTE — TELEPHONE ENCOUNTER
----- Message from Karthikeyan Lane MD sent at 11/20/2020  3:47 PM EST -----  Please let patient know the potassium was low on labs obtained prior to heart catheterization. Needs to get repeat BMP.

## 2020-11-20 NOTE — PROCEDURES
[address]                  [address] [facility],  [facility]                            CARDIAC CATHETERIZATION    PATIENT NAME: Aung Lopez                 :        1960  MED REC NO:   [id number]                            ROOM:  ACCOUNT NO:   [de-identified]                           ADMIT DATE: 2020  PROVIDER:     Hanh Alba MD    DATE OF PROCEDURE:  2020    REFERRING PROVIDER:  Dr. Rancho Escoto. INDICATION:  Atypical chest pain, elevated calcium score on CTA with  evidence of triple-vessel CAD. PROCEDURE NOTE:  The patient was brought to the cardiac cath lab in her  usual fasting state. Under sterile condition and under local anesthetic  and using conscious sedation, a 6-Romanian Terumo slender sheath was  inserted into the right radial artery. The patient received 5000 of  intravenous heparin. She also received 200 mcg of intraarterial  nitroglycerin and 2.5 mg of diluted verapamil via the right radial  sheath. Then a 5-Romanian TIG diagnostic catheter was advanced over a  Wholey wire to the ascending aorta without difficulty. The left main  coronary artery was engaged and a coronary angiogram was done. Then the  right coronary artery was engaged and a coronary angiogram was done. This catheter was exchanged over a guidewire to a 5-Romanian pigtail,  which was advanced into the left ventricle without difficulty. The left  ventricular end-diastolic pressure was measured. Left ventriculogram  was done. There was no significant gradient across the aortic valve. At the end of the procedure, pigtail was pulled out. The Terumo slender  sheath was pulled out and a Vasc Band was applied over the right radial  artery with good hemostasis. The patient tolerated the procedure very  well and no complications were noted. No significant blood loss  occurred during the procedure. FINDINGS:  HEMODYNAMICS:  1.   Aortic pressure 132/57 mmHg.  2.  Left ventricular end-diastolic pressure 21 mmHg. CORONARY ANATOMY:  1. Left main:  It is a long artery which is medium in size with 40% to  50% discrete eccentric distal stenosis. 2.  LAD:  It is medium in size and calcified in its proximal to mid  segment. It gives rise to two diagonal branches and several small  septal perforators. There was 40% to 50% diffuse proximal to mid  stenosis followed up by 70% tubular mid stenosis. There was also 50% to  60% tubular apical LAD stenosis. The first diagonal was fair in size  with 50% long proximal stenosis. The second diagonal was small with 70%  to 80% tubular proximal stenosis. 3.  Ramus branch: It is a very small and bifurcating and subtotally  occluded proximally and filling late probably by collateral circulation. 4.  Left circumflex: It is medium size, giving rise to two obtuse  marginal branches, an SA node and probably AV maria victoria branch. There was a  long mid 70% to 80% stenosis. 5.  RCA:  It is a large dominant artery which is calcified in its  proximal to distal segment. It gives rise to a small conus branch, two  right ventricular marginal branches, a fair size PDA and fair size PLV  branch. There was a long 60% [id number]-ex-awq stenosis. There was 50%  to 60% ostial PLV branch stenosis. There was 50% to 60% discrete mid  PDA stenosis. 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 6.  Left ventriculogram revealed an ejection fraction of 65%. No mitral  regurgitation was noted. IMPRESSION:  1. Severe triple-vessel CAD. 2.  Elevated LVEDP. 3.  Ejection fraction 65%. 4.  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 RECOMMENDATIONS:  1. Aggressive medical therapy. 2.  CABG. PROCEDURE START TIME:  07:54 a.m. PROCEDURE END TIME:  08:20 a.m. FLUROSCOPY TIME:  3.1 minutes. CONSTRAST VOLUME:  90 minutes of Optiray.     CONSCIOUS SEDATION: 2 mg of Versed and 100 mcg of intravenous fentanyl.         Judith Frey MD    D: 11/20/2020 8:59:27       T: 11/20/2020 9:11:28     [email or web address]  Job#: [id number]     Doc#: [id number]    CC:

## 2020-11-23 ENCOUNTER — HOSPITAL ENCOUNTER (OUTPATIENT)
Age: 60
Discharge: HOME | End: 2020-11-23
Payer: COMMERCIAL

## 2020-11-23 LAB
ANION GAP SERPL CALCULATED.3IONS-SCNC: 11 MMOL/L (ref 7–16)
BUN BLDV-MCNC: 9 MG/DL (ref 8–23)
CALCIUM SERPL-MCNC: 10.1 MG/DL (ref 8.6–10.2)
CHLORIDE BLD-SCNC: 100 MMOL/L (ref 98–107)
CO2: 31 MMOL/L (ref 22–29)
CREAT SERPL-MCNC: 0.9 MG/DL (ref 0.5–1)
GFR AFRICAN AMERICAN: >60
GFR NON-AFRICAN AMERICAN: >60 ML/MIN/1.73
GLUCOSE BLD-MCNC: 116 MG/DL (ref 74–99)
POTASSIUM SERPL-SCNC: 4 MMOL/L (ref 3.5–5)
SODIUM BLD-SCNC: 142 MMOL/L (ref 132–146)

## 2020-11-23 PROCEDURE — 36415 COLL VENOUS BLD VENIPUNCTURE: CPT

## 2020-11-23 PROCEDURE — 80048 BASIC METABOLIC PNL TOTAL CA: CPT

## 2020-11-24 ENCOUNTER — OFFICE VISIT (OUTPATIENT)
Dept: CARDIOLOGY CLINIC | Age: 60
End: 2020-11-24
Payer: COMMERCIAL

## 2020-11-24 VITALS
HEIGHT: 66 IN | HEART RATE: 58 BPM | DIASTOLIC BLOOD PRESSURE: 80 MMHG | SYSTOLIC BLOOD PRESSURE: 118 MMHG | RESPIRATION RATE: 16 BRPM | BODY MASS INDEX: 32.14 KG/M2 | WEIGHT: 200 LBS

## 2020-11-24 PROCEDURE — 93000 ELECTROCARDIOGRAM COMPLETE: CPT | Performed by: INTERNAL MEDICINE

## 2020-11-24 PROCEDURE — 99214 OFFICE O/P EST MOD 30 MIN: CPT | Performed by: INTERNAL MEDICINE

## 2020-11-24 RX ORDER — ROSUVASTATIN CALCIUM 40 MG/1
40 TABLET, COATED ORAL EVERY EVENING
COMMUNITY
End: 2020-11-24 | Stop reason: SDUPTHER

## 2020-11-24 RX ORDER — METOPROLOL SUCCINATE 50 MG/1
50 TABLET, EXTENDED RELEASE ORAL DAILY
COMMUNITY
End: 2020-11-24 | Stop reason: SDUPTHER

## 2020-11-24 RX ORDER — METOPROLOL SUCCINATE 50 MG/1
50 TABLET, EXTENDED RELEASE ORAL DAILY
Qty: 30 TABLET | Refills: 5 | Status: SHIPPED
Start: 2020-11-24 | End: 2021-05-26 | Stop reason: SDUPTHER

## 2020-11-24 RX ORDER — ROSUVASTATIN CALCIUM 40 MG/1
40 TABLET, COATED ORAL EVERY EVENING
Qty: 30 TABLET | Refills: 5 | Status: SHIPPED
Start: 2020-11-24 | End: 2021-05-26 | Stop reason: SDUPTHER

## 2020-11-24 NOTE — PROGRESS NOTES
Patient Active Problem List   Diagnosis    Essential hypertension    Vitamin D deficiency    Pure hypercholesterolemia       Current Outpatient Medications   Medication Sig Dispense Refill    rosuvastatin (CRESTOR) 40 MG tablet Take 1 tablet by mouth every evening 30 tablet 5    metoprolol succinate (TOPROL XL) 50 MG extended release tablet Take 1 tablet by mouth daily 30 tablet 5    naproxen (NAPROSYN) 250 MG tablet Take 250 mg by mouth 2 times daily as needed for Pain      Coenzyme Q10 (CO Q-10) 100 MG CAPS Take 200 mg by mouth daily       ezetimibe (ZETIA) 10 MG tablet Take 1 tablet by mouth daily 30 tablet 11    hydrochlorothiazide (HYDRODIURIL) 25 MG tablet Take 1 tablet by mouth daily 30 tablet 11    amLODIPine (NORVASC) 5 MG tablet Take 1 tablet by mouth daily 30 tablet 11    Cholecalciferol (VITAMIN D3) 2000 units CAPS Take by mouth      calcium carbonate 600 MG TABS tablet Take 1 tablet by mouth daily      Multiple Vitamin (MULTI-VITAMIN DAILY PO) Take by mouth      diclofenac sodium 1 % GEL Apply 4 g topically 4 times daily (Patient taking differently: Apply 4 g topically as needed ) 2 Tube 5    aspirin 81 MG EC tablet Take 1 tablet by mouth daily 30 tablet 3    omeprazole (PRILOSEC) 10 MG capsule Take 10 mg by mouth daily. No current facility-administered medications for this visit. CC:    Patient is seen in post cath follow-up. :  1. Coronary artery disease involving native coronary artery of native heart without angina pectoris    2. Essential hypertension    3. Pure hypercholesterolemia    4. Family history of early CAD        HPI:  Seen post cath which revealed multivessel CAD. She denies any anginal-like symptoms. She still has occasional palpitations.   Requesting to see Dr. Duane John for second opinion regarding her CAD at [facility].    ROS:   General: No unusual weight gain, no change in exercise tolerance  Skin: No rash or itching  EENT: No vision changes or nosebleeds  Cardiovascular: No orthopnea or paroxysmal nocturnal dyspnea  Respiratory: No cough or hemoptysis  Gastrointestinal: No hematemesis or recent changes in bowel habits  Genitourinary: No hematuria, urgency or frequency  Musculoskeletal: No muscular weakness or joint swelling   Neurologic / Psychiatric: No incoordination or convulsions  Allergic / Immunologic/ Lymphatic / Endocrine: No anemia or bleeding tendency    Social History     Socioeconomic History    Marital status:      Spouse name: Not on file    Number of children: Not on file    Years of education: Not on file    Highest education level: Not on file   Occupational History    Not on file   Social Needs    Financial resource strain: Not on file    Food insecurity     Worry: Not on file     Inability: Not on file    Transportation needs     Medical: Not on file     Non-medical: Not on file   Tobacco Use    Smoking status: Former Smoker     Types: Cigarettes    Smokeless tobacco: Never Used   Substance and Sexual Activity    Alcohol use:  Yes     Alcohol/week: 2.0 standard drinks     Types: 2 Glasses of wine per week    Drug use: No    Sexual activity: Not on file   Lifestyle    Physical activity     Days per week: Not on file     Minutes per session: Not on file    Stress: Not on file   Relationships    Social connections     Talks on phone: Not on file     Gets together: Not on file     Attends Yazidi service: Not on file     Active member of club or organization: Not on file     Attends meetings of clubs or organizations: Not on file     Relationship status: Not on file    Intimate partner violence     Fear of current or ex partner: Not on file     Emotionally abused: Not on file     Physically abused: Not on file     Forced sexual activity: Not on file   Other Topics Concern    Not on file   Social History Narrative    Not on file       Family History   Problem Relation Age of Onset    Heart Disease Mother     Stroke Mother     High Blood Pressure Mother     Diabetes Father     Heart Disease Father     High Blood Pressure Father     Other Sister         osteoporosis    Heart Disease Sister         MVP       Past Medical History:   Diagnosis Date    Arthritis     osteoarthritis    Cancer ([address]     Basal cell carcinoma, nose    Dysthymic disorder     Hyperlipidemia     Hypertension     Hypothyroidism     PONV (postoperative nausea and vomiting)     Seborrhea capitis     TIA (transient ischemic attack)     Vertigo     Vitamin D deficiency        PHYSICAL EXAM:  CONSTITUTIONAL:  Well developed, well nourished    Vitals:    11/24/20 0740   BP: 118/80   Pulse: 58   Resp: 16   Weight: 200 lb (90.7 kg)   Height: 5' 6\" (1.676 m)     HEAD & FACE: Normocephalic. Symmetric. EYES: No xanthelasma. Conjunctivae not injected. EARS, NOSE, MOUTH & THROAT: Good dentition. No oral pallor or cyanosis. NECK: No JVD at 30 degrees. No thyromegaly. RESPIRATORY: Clear to auscultation and percussion in all fields. No use of accessory muscle or intercostal retractions. CARDIOVASCULAR: Regular rate and rhythm. No lifts or thrills on palpitation. Auscultation with normal S1-S2 in intensity and splitting. No carotid bruits. Abdominal aorta not enlarged. Femoral arteries without bruits. Pedal pulses 2+. No edema. ABDOMEN: Soft without hepatic or splenic enlargement. No tenderness. MUSCULOSKELETAL: No kyphosis or scoliosis of the back. Good muscle strength and tone. No muscle atrophy. Normal gait and ability to undergo exercise stress testing. EXTREMITIES: No clubbing or cyanosis. SKIN: No Xanthomas or ulcerations. NEUROLOGIC: Oriented to time, place and person. Normal mood and affect. LYMPHATIC:  No palpable neck or supraclavicular nodes. No splenomegaly. EKG: the EKG tracing was reviewed and found to reveal: Normal sinus rhythm. No change compared to prior tracing. ASSESSMENT:                                                     ORDERS:       Diagnosis Orders   1. Coronary artery disease involving native coronary artery of native heart without angina pectoris  EKG 12 Lead   2. Essential hypertension     3. Pure hypercholesterolemia     4. Family history of early CAD       Above assessment cardiac issues stable. PLAN:   See above orders. Medication reconciliation completed. Last LDL 88-will increase Crestor to 40 mg daily. Increase metoprolol succinate to 50 mg daily for palpitations. Referral to Dr. Jose David Huddleston MD interventionalists at [facility] for opinion on her CAD. Discussed issues that would prompt earlier evaluation. Same cardiac medications. Follow-up 3 months.

## 2021-02-15 PROBLEM — I25.118 CORONARY ARTERY DISEASE OF NATIVE ARTERY OF NATIVE HEART WITH STABLE ANGINA PECTORIS (HCC): Status: ACTIVE | Noted: 2021-02-15

## 2021-02-24 ENCOUNTER — OFFICE VISIT (OUTPATIENT)
Dept: CARDIOLOGY CLINIC | Age: 61
End: 2021-02-24
Payer: COMMERCIAL

## 2021-02-24 VITALS
DIASTOLIC BLOOD PRESSURE: 82 MMHG | HEIGHT: 66 IN | SYSTOLIC BLOOD PRESSURE: 122 MMHG | HEART RATE: 59 BPM | WEIGHT: 200 LBS | RESPIRATION RATE: 14 BRPM | BODY MASS INDEX: 32.14 KG/M2

## 2021-02-24 DIAGNOSIS — E78.00 PURE HYPERCHOLESTEROLEMIA: ICD-10-CM

## 2021-02-24 DIAGNOSIS — I25.10 CORONARY ARTERY DISEASE INVOLVING NATIVE CORONARY ARTERY OF NATIVE HEART WITHOUT ANGINA PECTORIS: Primary | ICD-10-CM

## 2021-02-24 DIAGNOSIS — I10 ESSENTIAL HYPERTENSION: ICD-10-CM

## 2021-02-24 DIAGNOSIS — Z82.49 FAMILY HISTORY OF EARLY CAD: ICD-10-CM

## 2021-02-24 PROCEDURE — 93000 ELECTROCARDIOGRAM COMPLETE: CPT | Performed by: INTERNAL MEDICINE

## 2021-02-24 PROCEDURE — 99213 OFFICE O/P EST LOW 20 MIN: CPT | Performed by: INTERNAL MEDICINE

## 2021-02-24 NOTE — PROGRESS NOTES
Patient Active Problem List   Diagnosis    Essential hypertension    Vitamin D deficiency    Pure hypercholesterolemia    Lung nodule    Coronary artery disease of native artery of native heart with stable angina pectoris (HCC)       Current Outpatient Medications   Medication Sig Dispense Refill    amLODIPine (NORVASC) 5 MG tablet Take 1 tablet by mouth daily 30 tablet 11    hydroCHLOROthiazide (HYDRODIURIL) 25 MG tablet Take 1 tablet by mouth daily 30 tablet 11    ezetimibe (ZETIA) 10 MG tablet Take 1 tablet by mouth daily 30 tablet 11    isosorbide mononitrate (IMDUR) 30 MG extended release tablet Take 1 tablet by mouth daily 30 tablet 3    rosuvastatin (CRESTOR) 40 MG tablet Take 1 tablet by mouth every evening 30 tablet 5    metoprolol succinate (TOPROL XL) 50 MG extended release tablet Take 1 tablet by mouth daily 30 tablet 5    naproxen (NAPROSYN) 250 MG tablet Take 250 mg by mouth 2 times daily as needed for Pain      Coenzyme Q10 (CO Q-10) 100 MG CAPS Take 200 mg by mouth daily       Cholecalciferol (VITAMIN D3) 2000 units CAPS Take by mouth      calcium carbonate 600 MG TABS tablet Take 1 tablet by mouth daily      Multiple Vitamin (MULTI-VITAMIN DAILY PO) Take by mouth      diclofenac sodium 1 % GEL Apply 4 g topically 4 times daily (Patient taking differently: Apply 4 g topically as needed ) 2 Tube 5    aspirin 81 MG EC tablet Take 1 tablet by mouth daily 30 tablet 3    omeprazole (PRILOSEC) 10 MG capsule Take 10 mg by mouth daily. No current facility-administered medications for this visit. CC:    Patient is seen in follow up for:  1. Coronary artery disease involving native coronary artery of native heart without angina pectoris    2. Essential hypertension    3. Pure hypercholesterolemia    4. Family history of early CAD        HPI:  Patient is doing well without any specific cardiac problems.   Patient denies any shortness of breath, chest pain, lightheadedness or dizziness. Patient is tolerating medications well without side effects. ROS:   General: No unusual weight gain, no change in exercise tolerance  Skin: No rash or itching  EENT: No vision changes or nosebleeds  Cardiovascular: No orthopnea or paroxysmal nocturnal dyspnea  Respiratory: No cough or hemoptysis  Gastrointestinal: No hematemesis or recent changes in bowel habits  Genitourinary: No hematuria, urgency or frequency  Musculoskeletal: No muscular weakness or joint swelling   Neurologic / Psychiatric: No incoordination or convulsions  Allergic / Immunologic/ Lymphatic / Endocrine: No anemia or bleeding tendency    Social History     Socioeconomic History    Marital status:      Spouse name: Not on file    Number of children: Not on file    Years of education: Not on file    Highest education level: Not on file   Occupational History    Not on file   Social Needs    Financial resource strain: Not on file    Food insecurity     Worry: Not on file     Inability: Not on file    Transportation needs     Medical: Not on file     Non-medical: Not on file   Tobacco Use    Smoking status: Never Smoker    Smokeless tobacco: Never Used   Substance and Sexual Activity    Alcohol use:  Yes     Alcohol/week: 2.0 standard drinks     Types: 2 Glasses of wine per week    Drug use: No    Sexual activity: Not on file   Lifestyle    Physical activity     Days per week: Not on file     Minutes per session: Not on file    Stress: Not on file   Relationships    Social connections     Talks on phone: Not on file     Gets together: Not on file     Attends Hinduism service: Not on file     Active member of club or organization: Not on file     Attends meetings of clubs or organizations: Not on file     Relationship status: Not on file    Intimate partner violence     Fear of current or ex partner: Not on file     Emotionally abused: Not on file     Physically abused: Not on file     Forced sexual activity: Not on file   Other Topics Concern    Not on file   Social History Narrative    Not on file       Family History   Problem Relation Age of Onset    Heart Disease Mother     Stroke Mother     High Blood Pressure Mother     Diabetes Father     Heart Disease Father     High Blood Pressure Father     Other Sister         osteoporosis    Heart Disease Sister         MVP       Past Medical History:   Diagnosis Date    Arthritis     osteoarthritis    CAD (coronary artery disease)     Cancer ([facility] [address]     Basal cell carcinoma, nose    Dysthymic disorder     Hyperlipidemia     Hypertension     PONV (postoperative nausea and vomiting)     Seborrhea capitis     TIA (transient ischemic attack)     Vertigo     Vitamin D deficiency        PHYSICAL EXAM:  CONSTITUTIONAL:  Well developed, well nourished    Vitals:    02/24/21 0955   BP: 122/82   Pulse: 59   Resp: 14   Weight: 200 lb (90.7 kg)   Height: 5' 6\" (1.676 m)     HEAD & FACE: Normocephalic. Symmetric. EYES: No xanthelasma. Conjunctivae not injected. EARS, NOSE, MOUTH & THROAT: Good dentition. No oral pallor or cyanosis. NECK: No JVD at 30 degrees. No thyromegaly. RESPIRATORY: Clear to auscultation and percussion in all fields. No use of accessory muscle or intercostal retractions. CARDIOVASCULAR: Regular rate and rhythm. No lifts or thrills on palpitation. Auscultation with normal S1-S2 in intensity and splitting. No carotid bruits. Abdominal aorta not enlarged. Femoral arteries without bruits. Pedal pulses 2+. No edema. ABDOMEN: Soft without hepatic or splenic enlargement. No tenderness. MUSCULOSKELETAL: No kyphosis or scoliosis of the back. Good muscle strength and tone. No muscle atrophy. Normal gait and ability to undergo exercise stress testing. EXTREMITIES: No clubbing or cyanosis. SKIN: No Xanthomas or ulcerations. NEUROLOGIC: Oriented to time, place and person. Normal mood and affect.     LYMPHATIC:  No palpable neck or supraclavicular nodes. No splenomegaly. EKG: the EKG tracing was reviewed and found to reveal: Normal sinus rhythm. No change compared to prior tracing. ASSESSMENT:                                                     ORDERS:       Diagnosis Orders   1. Coronary artery disease involving native coronary artery of native heart without angina pectoris  EKG 12 lead   2. Essential hypertension  EKG 12 lead   3. Pure hypercholesterolemia     4. Family history of early CAD       Above assessment cardiac issues stable. PLAN:   See above orders. Medication reconciliation completed. Old records were reviewed and found to reveal: last LDL 98. Repeat pending. Discussed issues that would prompt earlier evaluation. Same cardiac medications. Follow-up office visit in 6 months.

## 2021-03-02 ENCOUNTER — HOSPITAL ENCOUNTER (OUTPATIENT)
Age: 61
Discharge: HOME | End: 2021-03-02
Payer: COMMERCIAL

## 2021-03-02 DIAGNOSIS — E78.2 MIXED HYPERLIPIDEMIA: ICD-10-CM

## 2021-03-02 DIAGNOSIS — E55.9 VITAMIN D DEFICIENCY: ICD-10-CM

## 2021-03-02 LAB
ALBUMIN SERPL-MCNC: 4 G/DL (ref 3.5–5.2)
ALP BLD-CCNC: 67 U/L (ref 35–104)
ALT SERPL-CCNC: 38 U/L (ref 0–32)
ANION GAP SERPL CALCULATED.3IONS-SCNC: 10 MMOL/L (ref 7–16)
AST SERPL-CCNC: 31 U/L (ref 0–31)
BILIRUB SERPL-MCNC: 0.5 MG/DL (ref 0–1.2)
BUN BLDV-MCNC: 16 MG/DL (ref 8–23)
CALCIUM SERPL-MCNC: 9.3 MG/DL (ref 8.6–10.2)
CHLORIDE BLD-SCNC: 101 MMOL/L (ref 98–107)
CHOLESTEROL, TOTAL: 162 MG/DL (ref 0–199)
CO2: 30 MMOL/L (ref 22–29)
CREAT SERPL-MCNC: 0.9 MG/DL (ref 0.5–1)
GFR AFRICAN AMERICAN: >60
GFR NON-AFRICAN AMERICAN: >60 ML/MIN/1.73
GLUCOSE BLD-MCNC: 99 MG/DL (ref 74–99)
HCT VFR BLD CALC: 42.2 % (ref 34–48)
HDLC SERPL-MCNC: 55 MG/DL
HEMOGLOBIN: 13.5 G/DL (ref 11.5–15.5)
LDL CHOLESTEROL CALCULATED: 85 MG/DL (ref 0–99)
MCH RBC QN AUTO: 28.5 PG (ref 26–35)
MCHC RBC AUTO-ENTMCNC: 32 % (ref 32–34.5)
MCV RBC AUTO: 89.2 FL (ref 80–99.9)
PDW BLD-RTO: 14.3 FL (ref 11.5–15)
PLATELET # BLD: 309 E9/L (ref 130–450)
PMV BLD AUTO: 10.6 FL (ref 7–12)
POTASSIUM SERPL-SCNC: 4.1 MMOL/L (ref 3.5–5)
RBC # BLD: 4.73 E12/L (ref 3.5–5.5)
SODIUM BLD-SCNC: 141 MMOL/L (ref 132–146)
TOTAL PROTEIN: 7.2 G/DL (ref 6.4–8.3)
TRIGL SERPL-MCNC: 111 MG/DL (ref 0–149)
VITAMIN D 25-HYDROXY: 22 NG/ML (ref 30–100)
VLDLC SERPL CALC-MCNC: 22 MG/DL
WBC # BLD: 5.1 E9/L (ref 4.5–11.5)

## 2021-03-02 PROCEDURE — 85027 COMPLETE CBC AUTOMATED: CPT

## 2021-03-02 PROCEDURE — 36415 COLL VENOUS BLD VENIPUNCTURE: CPT

## 2021-03-02 PROCEDURE — 80053 COMPREHEN METABOLIC PANEL: CPT

## 2021-03-02 PROCEDURE — 80061 LIPID PANEL: CPT

## 2021-03-02 PROCEDURE — 82306 VITAMIN D 25 HYDROXY: CPT

## 2021-05-18 PROBLEM — I25.10 CAD IN NATIVE ARTERY: Status: ACTIVE | Noted: 2021-05-18

## 2021-05-26 RX ORDER — ROSUVASTATIN CALCIUM 40 MG/1
40 TABLET, COATED ORAL EVERY EVENING
Qty: 30 TABLET | Refills: 5 | Status: SHIPPED
Start: 2021-05-26 | End: 2021-08-23 | Stop reason: SDUPTHER

## 2021-05-26 RX ORDER — METOPROLOL SUCCINATE 50 MG/1
50 TABLET, EXTENDED RELEASE ORAL DAILY
Qty: 30 TABLET | Refills: 5 | Status: SHIPPED
Start: 2021-05-26 | End: 2021-08-23 | Stop reason: SDUPTHER

## 2021-06-10 ENCOUNTER — HOSPITAL ENCOUNTER (OUTPATIENT)
Age: 61
Discharge: HOME | End: 2021-06-10
Payer: COMMERCIAL

## 2021-06-10 DIAGNOSIS — E55.9 VITAMIN D DEFICIENCY: ICD-10-CM

## 2021-06-10 DIAGNOSIS — Z00.00 ROUTINE GENERAL MEDICAL EXAMINATION AT A HEALTH CARE FACILITY: ICD-10-CM

## 2021-06-10 DIAGNOSIS — E78.2 MIXED HYPERLIPIDEMIA: ICD-10-CM

## 2021-06-10 LAB
ALBUMIN SERPL-MCNC: 3.9 G/DL (ref 3.5–5.2)
ALP BLD-CCNC: 69 U/L (ref 35–104)
ALT SERPL-CCNC: 15 U/L (ref 0–32)
ANION GAP SERPL CALCULATED.3IONS-SCNC: 10 MMOL/L (ref 7–16)
AST SERPL-CCNC: 18 U/L (ref 0–31)
BILIRUB SERPL-MCNC: 0.3 MG/DL (ref 0–1.2)
BUN BLDV-MCNC: 10 MG/DL (ref 6–23)
CALCIUM SERPL-MCNC: 9.4 MG/DL (ref 8.6–10.2)
CHLORIDE BLD-SCNC: 107 MMOL/L (ref 98–107)
CHOLESTEROL, TOTAL: 134 MG/DL (ref 0–199)
CO2: 27 MMOL/L (ref 22–29)
CREAT SERPL-MCNC: 0.7 MG/DL (ref 0.5–1)
GFR AFRICAN AMERICAN: >60
GFR NON-AFRICAN AMERICAN: >60 ML/MIN/1.73
GLUCOSE BLD-MCNC: 99 MG/DL (ref 74–99)
HCT VFR BLD CALC: 35.2 % (ref 34–48)
HDLC SERPL-MCNC: 44 MG/DL
HEMOGLOBIN: 11.1 G/DL (ref 11.5–15.5)
LDL CHOLESTEROL CALCULATED: 68 MG/DL (ref 0–99)
MCH RBC QN AUTO: 28.9 PG (ref 26–35)
MCHC RBC AUTO-ENTMCNC: 31.5 % (ref 32–34.5)
MCV RBC AUTO: 91.7 FL (ref 80–99.9)
PDW BLD-RTO: 15.5 FL (ref 11.5–15)
PLATELET # BLD: 470 E9/L (ref 130–450)
PMV BLD AUTO: 10.3 FL (ref 7–12)
POTASSIUM SERPL-SCNC: 3.6 MMOL/L (ref 3.5–5)
RBC # BLD: 3.84 E12/L (ref 3.5–5.5)
SODIUM BLD-SCNC: 144 MMOL/L (ref 132–146)
TOTAL PROTEIN: 6.7 G/DL (ref 6.4–8.3)
TRIGL SERPL-MCNC: 111 MG/DL (ref 0–149)
VITAMIN D 25-HYDROXY: 25 NG/ML (ref 30–100)
VLDLC SERPL CALC-MCNC: 22 MG/DL
WBC # BLD: 4.9 E9/L (ref 4.5–11.5)

## 2021-06-10 PROCEDURE — 85027 COMPLETE CBC AUTOMATED: CPT

## 2021-06-10 PROCEDURE — 82306 VITAMIN D 25 HYDROXY: CPT

## 2021-06-10 PROCEDURE — 80053 COMPREHEN METABOLIC PANEL: CPT

## 2021-06-10 PROCEDURE — 80061 LIPID PANEL: CPT

## 2021-06-10 PROCEDURE — 36415 COLL VENOUS BLD VENIPUNCTURE: CPT

## 2021-07-07 ENCOUNTER — TELEPHONE (OUTPATIENT)
Dept: CARDIAC REHAB | Age: 61
End: 2021-07-07

## 2021-08-23 ENCOUNTER — OFFICE VISIT (OUTPATIENT)
Dept: CARDIOLOGY CLINIC | Age: 61
End: 2021-08-23
Payer: COMMERCIAL

## 2021-08-23 VITALS
RESPIRATION RATE: 18 BRPM | BODY MASS INDEX: 31.77 KG/M2 | HEIGHT: 66 IN | DIASTOLIC BLOOD PRESSURE: 80 MMHG | HEART RATE: 57 BPM | WEIGHT: 197.7 LBS | SYSTOLIC BLOOD PRESSURE: 134 MMHG

## 2021-08-23 DIAGNOSIS — Z95.1 HX OF CABG: ICD-10-CM

## 2021-08-23 DIAGNOSIS — I10 ESSENTIAL HYPERTENSION: ICD-10-CM

## 2021-08-23 DIAGNOSIS — I25.10 CORONARY ARTERY DISEASE INVOLVING NATIVE CORONARY ARTERY OF NATIVE HEART WITHOUT ANGINA PECTORIS: Primary | ICD-10-CM

## 2021-08-23 DIAGNOSIS — E78.00 PURE HYPERCHOLESTEROLEMIA: ICD-10-CM

## 2021-08-23 DIAGNOSIS — Z82.49 FAMILY HISTORY OF EARLY CAD: ICD-10-CM

## 2021-08-23 PROCEDURE — 93000 ELECTROCARDIOGRAM COMPLETE: CPT | Performed by: INTERNAL MEDICINE

## 2021-08-23 PROCEDURE — 99213 OFFICE O/P EST LOW 20 MIN: CPT | Performed by: INTERNAL MEDICINE

## 2021-08-23 RX ORDER — ROSUVASTATIN CALCIUM 40 MG/1
40 TABLET, COATED ORAL EVERY EVENING
Qty: 30 TABLET | Refills: 11 | Status: SHIPPED
Start: 2021-08-23 | End: 2022-02-28 | Stop reason: SDUPTHER

## 2021-08-23 RX ORDER — METOPROLOL SUCCINATE 50 MG/1
50 TABLET, EXTENDED RELEASE ORAL DAILY
Qty: 30 TABLET | Refills: 11 | Status: SHIPPED
Start: 2021-08-23 | End: 2022-02-28 | Stop reason: SDUPTHER

## 2021-08-23 NOTE — PROGRESS NOTES
Patient Active Problem List   Diagnosis    Essential hypertension    Vitamin D deficiency    Pure hypercholesterolemia    Lung nodule    Coronary artery disease of native artery of native heart with stable angina pectoris (HCC)       Current Outpatient Medications   Medication Sig Dispense Refill    rosuvastatin (CRESTOR) 40 MG tablet Take 1 tablet by mouth every evening 30 tablet 11    metoprolol succinate (TOPROL XL) 50 MG extended release tablet Take 1 tablet by mouth daily 30 tablet 11    naproxen (NAPROSYN) 250 MG tablet Take 250 mg by mouth 2 times daily as needed for Pain      acetaminophen (TYLENOL) 500 MG tablet Take 2 tablets by mouth every 8 hours 120 tablet 0    ezetimibe (ZETIA) 10 MG tablet Take 1 tablet by mouth daily 30 tablet 11    Coenzyme Q10 (CO Q-10) 100 MG CAPS Take 200 mg by mouth daily       Cholecalciferol (VITAMIN D3) 2000 units CAPS Take by mouth daily       calcium carbonate 600 MG TABS tablet Take 1 tablet by mouth daily      Multiple Vitamin (MULTI-VITAMIN DAILY PO) Take by mouth      aspirin 81 MG EC tablet Take 1 tablet by mouth daily 30 tablet 3    omeprazole (PRILOSEC) 10 MG capsule Take 10 mg by mouth daily. No current facility-administered medications for this visit. CC:    Patient is seen in follow up for:  1. Coronary artery disease involving native coronary artery of native heart without angina pectoris    2. Essential hypertension    3. Pure hypercholesterolemia    4. Family history of early CAD    11. Hx of CABG        HPI:  Patient is doing well without any specific cardiac problems. Patient denies any shortness of breath, chest pain, lightheadedness or dizziness. Patient is tolerating medications well without side effects. Doing well at cardiac rehab post CABG.     ROS:   General: No unusual weight gain, no change in exercise tolerance  Skin: No rash or itching  EENT: No vision changes or nosebleeds  Cardiovascular: No orthopnea or paroxysmal nocturnal dyspnea  Respiratory: No cough or hemoptysis  Gastrointestinal: No hematemesis or recent changes in bowel habits  Genitourinary: No hematuria, urgency or frequency  Musculoskeletal: No muscular weakness or joint swelling   Neurologic / Psychiatric: No incoordination or convulsions  Allergic / Immunologic/ Lymphatic / Endocrine: No anemia or bleeding tendency    Social History     Socioeconomic History    Marital status:      Spouse name: Not on file    Number of children: Not on file    Years of education: Not on file    Highest education level: Not on file   Occupational History    Not on file   Tobacco Use    Smoking status: Former Smoker     Years: 3.00     Types: Cigarettes    Smokeless tobacco: Never Used    Tobacco comment: smoked a couple cigarettes/day; Quit    Vaping Use    Vaping Use: Never used   Substance and Sexual Activity    Alcohol use: Yes     Alcohol/week: 2.0 standard drinks     Types: 2 Glasses of wine per week    Drug use: No    Sexual activity: Not on file   Other Topics Concern    Not on file   Social History Narrative    Not on file     Social Determinants of Health     Financial Resource Strain:     Difficulty of Paying Living Expenses:    Food Insecurity:     Worried About Running Out of Food in the Last Year:     [address] in the Last Year:    Transportation Needs:     Lack of Transportation (Medical):      Lack of Transportation (Non-Medical):    Physical Activity:     Days of Exercise per Week:     Minutes of Exercise per Session:    Stress:     Feeling of Stress :    Social Connections:     Frequency of Communication with Friends and Family:     Frequency of Social Gatherings with Friends and Family:     Attends U[employer] Services:     Active Member of Clubs or Organizations:     Attends Club or Organization Meetings:     Marital Status:    Intimate Partner Violence:     Fear of Current or Ex-Partner:     Emotionally Abused:     Physically Abused:     Sexually Abused:        Family History   Problem Relation Age of Onset    Heart Disease Mother     Stroke Mother     High Blood Pressure Mother     Diabetes Father     Heart Disease Father     High Blood Pressure Father     Other Sister         osteoporosis    Heart Disease Sister         MVP       Past Medical History:   Diagnosis Date    Arthritis     osteoarthritis    CAD (coronary artery disease)     Cancer ([facility] [address]     Basal cell carcinoma, nose    Dysthymic disorder     Fibromyalgia     GERD (gastroesophageal reflux disease)     Hyperlipidemia     Hypertension     PONV (postoperative nausea and vomiting)     Pulmonary histoplasmosis granuloma (HCC)     right    Seborrhea capitis     TIA (transient ischemic attack) 10/2019    Vertigo     Vitamin D deficiency        PHYSICAL EXAM:  CONSTITUTIONAL:  Well developed, well nourished    Vitals:    08/23/21 1002   BP: 134/80   Pulse: 57   Resp: 18   Weight: 197 lb 11.2 oz (89.7 kg)   Height: 5' 6\" (1.676 m)     HEAD & FACE: Normocephalic. Symmetric. EYES: No xanthelasma. Conjunctivae not injected. EARS, NOSE, MOUTH & THROAT: Good dentition. No oral pallor or cyanosis. NECK: No JVD at 30 degrees. No thyromegaly. RESPIRATORY: Clear to auscultation and percussion in all fields. No use of accessory muscle or intercostal retractions. CARDIOVASCULAR: Regular rate and rhythm. No lifts or thrills on palpitation. Auscultation with normal S1-S2 in intensity and splitting. No carotid bruits. Abdominal aorta not enlarged. Femoral arteries without bruits. Pedal pulses 2+. No edema. ABDOMEN: Soft without hepatic or splenic enlargement. No tenderness. MUSCULOSKELETAL: No kyphosis or scoliosis of the back. Good muscle strength and tone. No muscle atrophy. Normal gait and ability to undergo exercise stress testing. EXTREMITIES: No clubbing or cyanosis. SKIN: No Xanthomas or ulcerations.   NEUROLOGIC: Oriented to time, place and person. Normal mood and affect. LYMPHATIC:  No palpable neck or supraclavicular nodes. No splenomegaly. EKG: the EKG tracing was reviewed and found to reveal: Normal sinus rhythm. RBBB  ms. New change compared to prior tracing. ASSESSMENT:                                                     ORDERS:       Diagnosis Orders   1. Coronary artery disease involving native coronary artery of native heart without angina pectoris  EKG 12 Lead   2. Essential hypertension     3. Pure hypercholesterolemia     4. Family history of early CAD     11. Hx of CABG       Above assessment cardiac issues stable. PLAN:   See above orders. Medication reconciliation completed. Old records were reviewed and found to reveal: LDL 68  Discussed issues that would prompt earlier evaluation. Same cardiac medications. Follow-up office visit in 1 year.

## 2022-01-13 ENCOUNTER — HOSPITAL ENCOUNTER (OUTPATIENT)
Age: 62
Discharge: HOME | End: 2022-01-13
Payer: COMMERCIAL

## 2022-01-13 DIAGNOSIS — E55.9 VITAMIN D DEFICIENCY: ICD-10-CM

## 2022-01-13 DIAGNOSIS — E78.2 MIXED HYPERLIPIDEMIA: ICD-10-CM

## 2022-01-13 LAB
ALBUMIN SERPL-MCNC: 4.3 G/DL (ref 3.5–5.2)
ALP BLD-CCNC: 62 U/L (ref 35–104)
ALT SERPL-CCNC: 18 U/L (ref 0–32)
ANION GAP SERPL CALCULATED.3IONS-SCNC: 10 MMOL/L (ref 7–16)
AST SERPL-CCNC: 20 U/L (ref 0–31)
BILIRUB SERPL-MCNC: 0.6 MG/DL (ref 0–1.2)
BUN BLDV-MCNC: 21 MG/DL (ref 6–23)
CALCIUM SERPL-MCNC: 9.7 MG/DL (ref 8.6–10.2)
CHLORIDE BLD-SCNC: 104 MMOL/L (ref 98–107)
CHOLESTEROL, TOTAL: 170 MG/DL (ref 0–199)
CO2: 26 MMOL/L (ref 22–29)
CREAT SERPL-MCNC: 0.8 MG/DL (ref 0.5–1)
GFR AFRICAN AMERICAN: >60
GFR NON-AFRICAN AMERICAN: >60 ML/MIN/1.73
GLUCOSE BLD-MCNC: 100 MG/DL (ref 74–99)
HCT VFR BLD CALC: 42.7 % (ref 34–48)
HDLC SERPL-MCNC: 57 MG/DL
HEMOGLOBIN: 13.8 G/DL (ref 11.5–15.5)
LDL CHOLESTEROL CALCULATED: 92 MG/DL (ref 0–99)
MCH RBC QN AUTO: 28.5 PG (ref 26–35)
MCHC RBC AUTO-ENTMCNC: 32.3 % (ref 32–34.5)
MCV RBC AUTO: 88 FL (ref 80–99.9)
PDW BLD-RTO: 14.2 FL (ref 11.5–15)
PLATELET # BLD: 294 E9/L (ref 130–450)
PMV BLD AUTO: 10.2 FL (ref 7–12)
POTASSIUM SERPL-SCNC: 4.7 MMOL/L (ref 3.5–5)
RBC # BLD: 4.85 E12/L (ref 3.5–5.5)
SODIUM BLD-SCNC: 140 MMOL/L (ref 132–146)
TOTAL PROTEIN: 7.2 G/DL (ref 6.4–8.3)
TRIGL SERPL-MCNC: 103 MG/DL (ref 0–149)
VITAMIN D 25-HYDROXY: 35 NG/ML (ref 30–100)
VLDLC SERPL CALC-MCNC: 21 MG/DL
WBC # BLD: 5.8 E9/L (ref 4.5–11.5)

## 2022-01-13 PROCEDURE — 80053 COMPREHEN METABOLIC PANEL: CPT

## 2022-01-13 PROCEDURE — 85027 COMPLETE CBC AUTOMATED: CPT

## 2022-01-13 PROCEDURE — 36415 COLL VENOUS BLD VENIPUNCTURE: CPT

## 2022-01-13 PROCEDURE — 82306 VITAMIN D 25 HYDROXY: CPT

## 2022-01-13 PROCEDURE — 80061 LIPID PANEL: CPT

## 2022-02-28 RX ORDER — ROSUVASTATIN CALCIUM 40 MG/1
40 TABLET, COATED ORAL EVERY EVENING
Qty: 90 TABLET | Refills: 1 | Status: SHIPPED | OUTPATIENT
Start: 2022-02-28

## 2022-02-28 RX ORDER — METOPROLOL SUCCINATE 50 MG/1
50 TABLET, EXTENDED RELEASE ORAL DAILY
Qty: 90 TABLET | Refills: 1 | Status: SHIPPED
Start: 2022-02-28 | End: 2022-07-26

## 2022-04-26 ENCOUNTER — HOSPITAL ENCOUNTER (OUTPATIENT)
Age: 62
Discharge: HOME | End: 2022-04-26
Payer: COMMERCIAL

## 2022-04-26 LAB
ALBUMIN SERPL-MCNC: 4.1 G/DL (ref 3.5–5.2)
ALP BLD-CCNC: 62 U/L (ref 35–104)
ALT SERPL-CCNC: 22 U/L (ref 0–32)
ANION GAP SERPL CALCULATED.3IONS-SCNC: 12 MMOL/L (ref 7–16)
AST SERPL-CCNC: 24 U/L (ref 0–31)
BILIRUB SERPL-MCNC: 0.7 MG/DL (ref 0–1.2)
BUN BLDV-MCNC: 14 MG/DL (ref 6–23)
CALCIUM SERPL-MCNC: 9.5 MG/DL (ref 8.6–10.2)
CHLORIDE BLD-SCNC: 104 MMOL/L (ref 98–107)
CO2: 25 MMOL/L (ref 22–29)
CREAT SERPL-MCNC: 0.9 MG/DL (ref 0.5–1)
GFR AFRICAN AMERICAN: >60
GFR NON-AFRICAN AMERICAN: >60 ML/MIN/1.73
GLUCOSE BLD-MCNC: 92 MG/DL (ref 74–99)
POTASSIUM SERPL-SCNC: 4.1 MMOL/L (ref 3.5–5)
SODIUM BLD-SCNC: 141 MMOL/L (ref 132–146)
TOTAL PROTEIN: 7.3 G/DL (ref 6.4–8.3)
TSH SERPL DL<=0.05 MIU/L-ACNC: 2.51 UIU/ML (ref 0.27–4.2)
VITAMIN D 25-HYDROXY: 47 NG/ML (ref 30–100)

## 2022-04-26 PROCEDURE — 80053 COMPREHEN METABOLIC PANEL: CPT

## 2022-04-26 PROCEDURE — 82306 VITAMIN D 25 HYDROXY: CPT

## 2022-04-26 PROCEDURE — 84443 ASSAY THYROID STIM HORMONE: CPT

## 2022-04-26 PROCEDURE — 36415 COLL VENOUS BLD VENIPUNCTURE: CPT

## 2022-07-26 RX ORDER — METOPROLOL SUCCINATE 50 MG/1
50 TABLET, EXTENDED RELEASE ORAL DAILY
Qty: 90 TABLET | Refills: 3 | Status: SHIPPED | OUTPATIENT
Start: 2022-07-26

## 2022-08-29 ENCOUNTER — HOSPITAL ENCOUNTER (OUTPATIENT)
Age: 62
Discharge: HOME | End: 2022-08-29
Payer: COMMERCIAL

## 2022-08-29 LAB
ALBUMIN SERPL-MCNC: 4.1 G/DL (ref 3.5–5.2)
ALP BLD-CCNC: 61 U/L (ref 35–104)
ALT SERPL-CCNC: 19 U/L (ref 0–32)
ANION GAP SERPL CALCULATED.3IONS-SCNC: 10 MMOL/L (ref 7–16)
AST SERPL-CCNC: 24 U/L (ref 0–31)
BILIRUB SERPL-MCNC: 0.9 MG/DL (ref 0–1.2)
BUN BLDV-MCNC: 19 MG/DL (ref 6–23)
CALCIUM SERPL-MCNC: 9.7 MG/DL (ref 8.6–10.2)
CHLORIDE BLD-SCNC: 105 MMOL/L (ref 98–107)
CHOLESTEROL, TOTAL: 167 MG/DL (ref 0–199)
CO2: 26 MMOL/L (ref 22–29)
CREAT SERPL-MCNC: 0.9 MG/DL (ref 0.5–1)
GFR AFRICAN AMERICAN: >60
GFR NON-AFRICAN AMERICAN: >60 ML/MIN/1.73
GLUCOSE BLD-MCNC: 93 MG/DL (ref 74–99)
HDLC SERPL-MCNC: 56 MG/DL
LDL CHOLESTEROL CALCULATED: 88 MG/DL (ref 0–99)
POTASSIUM SERPL-SCNC: 4.2 MMOL/L (ref 3.5–5)
SODIUM BLD-SCNC: 141 MMOL/L (ref 132–146)
TOTAL PROTEIN: 7 G/DL (ref 6.4–8.3)
TRIGL SERPL-MCNC: 115 MG/DL (ref 0–149)
VITAMIN D 25-HYDROXY: 47 NG/ML (ref 30–100)
VLDLC SERPL CALC-MCNC: 23 MG/DL

## 2022-08-29 PROCEDURE — 82306 VITAMIN D 25 HYDROXY: CPT

## 2022-08-29 PROCEDURE — 36415 COLL VENOUS BLD VENIPUNCTURE: CPT

## 2022-08-29 PROCEDURE — 80053 COMPREHEN METABOLIC PANEL: CPT

## 2022-08-29 PROCEDURE — 80061 LIPID PANEL: CPT

## 2023-02-24 ENCOUNTER — HOSPITAL ENCOUNTER (OUTPATIENT)
Age: 63
Discharge: HOME | End: 2023-02-24
Payer: COMMERCIAL

## 2023-02-24 LAB
ALBUMIN SERPL-MCNC: 4.1 G/DL (ref 3.5–5.2)
ALP BLD-CCNC: 58 U/L (ref 35–104)
ALT SERPL-CCNC: 19 U/L (ref 0–32)
ANION GAP SERPL CALCULATED.3IONS-SCNC: 9 MMOL/L (ref 7–16)
AST SERPL-CCNC: 23 U/L (ref 0–31)
BILIRUB SERPL-MCNC: 0.5 MG/DL (ref 0–1.2)
BUN BLDV-MCNC: 14 MG/DL (ref 6–23)
CALCIUM SERPL-MCNC: 9.7 MG/DL (ref 8.6–10.2)
CHLORIDE BLD-SCNC: 106 MMOL/L (ref 98–107)
CHOLESTEROL, TOTAL: 161 MG/DL (ref 0–199)
CO2: 27 MMOL/L (ref 22–29)
CREAT SERPL-MCNC: 0.9 MG/DL (ref 0.5–1)
GFR SERPL CREATININE-BSD FRML MDRD: >60 ML/MIN/1.73
GLUCOSE BLD-MCNC: 100 MG/DL (ref 74–99)
HDLC SERPL-MCNC: 57 MG/DL
LDL CHOLESTEROL CALCULATED: 83 MG/DL (ref 0–99)
POTASSIUM SERPL-SCNC: 4.7 MMOL/L (ref 3.5–5)
SODIUM BLD-SCNC: 142 MMOL/L (ref 132–146)
TOTAL PROTEIN: 7.2 G/DL (ref 6.4–8.3)
TRIGL SERPL-MCNC: 105 MG/DL (ref 0–149)
VLDLC SERPL CALC-MCNC: 21 MG/DL

## 2023-02-24 PROCEDURE — 36415 COLL VENOUS BLD VENIPUNCTURE: CPT

## 2023-02-24 PROCEDURE — 80061 LIPID PANEL: CPT

## 2023-02-24 PROCEDURE — 80053 COMPREHEN METABOLIC PANEL: CPT

## 2023-07-27 ENCOUNTER — HOSPITAL ENCOUNTER (OUTPATIENT)
Age: 63
Discharge: HOME | End: 2023-07-27
Payer: MEDICARE

## 2023-07-27 LAB
25(OH)D3 SERPL-MCNC: 55.9 NG/ML (ref 30–100)
ALBUMIN SERPL-MCNC: 4.2 G/DL (ref 3.5–5.2)
ALP SERPL-CCNC: 56 U/L (ref 35–104)
ALT SERPL-CCNC: 22 U/L (ref 0–32)
ANION GAP SERPL CALCULATED.3IONS-SCNC: 11 MMOL/L (ref 7–16)
AST SERPL-CCNC: 24 U/L (ref 0–31)
BASOPHILS # BLD: 0.06 K/UL (ref 0–0.2)
BASOPHILS NFR BLD: 1 % (ref 0–2)
BILIRUB SERPL-MCNC: 0.7 MG/DL (ref 0–1.2)
BILIRUB UR QL STRIP: NEGATIVE
BUN SERPL-MCNC: 13 MG/DL (ref 6–23)
CALCIUM SERPL-MCNC: 9.4 MG/DL (ref 8.6–10.2)
CHLORIDE SERPL-SCNC: 105 MMOL/L (ref 98–107)
CHOLEST SERPL-MCNC: 159 MG/DL
CLARITY UR: CLEAR
CO2 SERPL-SCNC: 27 MMOL/L (ref 22–29)
COLOR UR: YELLOW
CREAT SERPL-MCNC: 0.9 MG/DL (ref 0.5–1)
EOSINOPHIL # BLD: 0.15 K/UL (ref 0.05–0.5)
EOSINOPHILS RELATIVE PERCENT: 3 % (ref 0–6)
ERYTHROCYTE [DISTWIDTH] IN BLOOD BY AUTOMATED COUNT: 14 % (ref 11.5–15)
GFR SERPL CREATININE-BSD FRML MDRD: >60 ML/MIN/1.73M2
GLUCOSE SERPL-MCNC: 92 MG/DL (ref 74–99)
GLUCOSE UR STRIP-MCNC: NEGATIVE MG/DL
HCT VFR BLD AUTO: 43.2 % (ref 34–48)
HDLC SERPL-MCNC: 56 MG/DL
HGB BLD-MCNC: 14.1 G/DL (ref 11.5–15.5)
HGB UR QL STRIP.AUTO: NEGATIVE
IMM GRANULOCYTES # BLD AUTO: <0.03 K/UL (ref 0–0.58)
IMM GRANULOCYTES NFR BLD: 0 % (ref 0–5)
KETONES UR STRIP-MCNC: NEGATIVE MG/DL
LDLC SERPL CALC-MCNC: 80 MG/DL
LEUKOCYTE ESTERASE UR QL STRIP: NEGATIVE
LYMPHOCYTES NFR BLD: 1.28 K/UL (ref 1.5–4)
LYMPHOCYTES RELATIVE PERCENT: 26 % (ref 20–42)
MCH RBC QN AUTO: 28.8 PG (ref 26–35)
MCHC RBC AUTO-ENTMCNC: 32.6 G/DL (ref 32–34.5)
MCV RBC AUTO: 88.2 FL (ref 80–99.9)
MONOCYTES NFR BLD: 0.33 K/UL (ref 0.1–0.95)
MONOCYTES NFR BLD: 7 % (ref 2–12)
NEUTROPHILS NFR BLD: 63 % (ref 43–80)
NEUTS SEG NFR BLD: 3.15 K/UL (ref 1.8–7.3)
NITRITE UR QL STRIP: NEGATIVE
PH UR STRIP: 5.5 [PH] (ref 5–9)
PLATELET # BLD AUTO: 265 K/UL (ref 130–450)
PMV BLD AUTO: 11.1 FL (ref 7–12)
POTASSIUM SERPL-SCNC: 4.7 MMOL/L (ref 3.5–5)
PROT SERPL-MCNC: 7 G/DL (ref 6.4–8.3)
PROT UR STRIP-MCNC: NEGATIVE MG/DL
RBC # BLD AUTO: 4.9 M/UL (ref 3.5–5.5)
SODIUM SERPL-SCNC: 143 MMOL/L (ref 132–146)
SP GR UR STRIP: 1.02 (ref 1–1.03)
TRIGL SERPL-MCNC: 113 MG/DL
UROBILINOGEN UR STRIP-ACNC: 0.2 EU/DL (ref 0–1)
VLDLC SERPL CALC-MCNC: 23 MG/DL
WBC OTHER # BLD: 5 K/UL (ref 4.5–11.5)

## 2023-07-27 PROCEDURE — 80053 COMPREHEN METABOLIC PANEL: CPT

## 2023-07-27 PROCEDURE — 80061 LIPID PANEL: CPT

## 2023-07-27 PROCEDURE — 85027 COMPLETE CBC AUTOMATED: CPT

## 2023-07-27 PROCEDURE — 36415 COLL VENOUS BLD VENIPUNCTURE: CPT

## 2023-07-27 PROCEDURE — 81003 URINALYSIS AUTO W/O SCOPE: CPT

## 2023-07-27 PROCEDURE — 82306 VITAMIN D 25 HYDROXY: CPT
